# Patient Record
Sex: FEMALE | Race: WHITE | NOT HISPANIC OR LATINO | Employment: UNEMPLOYED | URBAN - METROPOLITAN AREA
[De-identification: names, ages, dates, MRNs, and addresses within clinical notes are randomized per-mention and may not be internally consistent; named-entity substitution may affect disease eponyms.]

---

## 2018-05-07 ENCOUNTER — HOSPITAL ENCOUNTER (EMERGENCY)
Facility: CLINIC | Age: 23
Discharge: HOME | End: 2018-05-07
Attending: PHYSICIAN ASSISTANT | Admitting: PHYSICIAN ASSISTANT
Payer: MEDICARE

## 2018-05-07 VITALS
SYSTOLIC BLOOD PRESSURE: 150 MMHG | WEIGHT: 205 LBS | TEMPERATURE: 97 F | HEART RATE: 93 BPM | OXYGEN SATURATION: 100 % | RESPIRATION RATE: 16 BRPM | DIASTOLIC BLOOD PRESSURE: 100 MMHG

## 2018-05-07 DIAGNOSIS — N30.00 ACUTE CYSTITIS WITHOUT HEMATURIA: ICD-10-CM

## 2018-05-07 LAB
ALBUMIN UR-MCNC: 30 MG/DL
APPEARANCE UR: ABNORMAL
BACTERIA #/AREA URNS HPF: ABNORMAL /HPF
BILIRUB UR QL STRIP: NEGATIVE
COLOR UR AUTO: YELLOW
GLUCOSE UR STRIP-MCNC: NEGATIVE MG/DL
HCG UR QL: NEGATIVE
HGB UR QL STRIP: ABNORMAL
KETONES UR STRIP-MCNC: NEGATIVE MG/DL
LEUKOCYTE ESTERASE UR QL STRIP: ABNORMAL
MUCOUS THREADS #/AREA URNS LPF: PRESENT /LPF
NITRATE UR QL: NEGATIVE
PH UR STRIP: 5 PH (ref 5–7)
RBC #/AREA URNS AUTO: 12 /HPF (ref 0–2)
SOURCE: ABNORMAL
SP GR UR STRIP: 1.03 (ref 1–1.03)
SQUAMOUS #/AREA URNS AUTO: 3 /HPF (ref 0–1)
UROBILINOGEN UR STRIP-MCNC: 0 MG/DL (ref 0–2)
WBC #/AREA URNS AUTO: 77 /HPF (ref 0–5)

## 2018-05-07 PROCEDURE — 87086 URINE CULTURE/COLONY COUNT: CPT | Performed by: PHYSICIAN ASSISTANT

## 2018-05-07 PROCEDURE — 87186 SC STD MICRODIL/AGAR DIL: CPT | Performed by: PHYSICIAN ASSISTANT

## 2018-05-07 PROCEDURE — 81025 URINE PREGNANCY TEST: CPT | Performed by: PHYSICIAN ASSISTANT

## 2018-05-07 PROCEDURE — 87088 URINE BACTERIA CULTURE: CPT | Performed by: PHYSICIAN ASSISTANT

## 2018-05-07 PROCEDURE — 81001 URINALYSIS AUTO W/SCOPE: CPT | Performed by: PHYSICIAN ASSISTANT

## 2018-05-07 PROCEDURE — 99213 OFFICE O/P EST LOW 20 MIN: CPT | Performed by: PHYSICIAN ASSISTANT

## 2018-05-07 PROCEDURE — G0463 HOSPITAL OUTPT CLINIC VISIT: HCPCS

## 2018-05-07 RX ORDER — NITROFURANTOIN 25; 75 MG/1; MG/1
100 CAPSULE ORAL 2 TIMES DAILY
Qty: 14 CAPSULE | Refills: 0 | Status: SHIPPED | OUTPATIENT
Start: 2018-05-07 | End: 2018-05-14

## 2018-05-07 ASSESSMENT — ENCOUNTER SYMPTOMS: DYSURIA: 1

## 2018-05-07 NOTE — ED AVS SNAPSHOT
[facility] Emergency Department    [address] [facility] [address]    Phone:  [phone]    Fax:  [phone]                                       Ana Murphy   MRN: [id number]    Department:  [facility] Emergency Department   Date of Visit:  5/7/2018           After Visit Summary Signature Page     I have received my discharge instructions, and my questions have been answered. I have discussed any challenges I see with this plan with the nurse or doctor.    ..........................................................................................................................................  Patient/Patient Representative Signature      ..........................................................................................................................................  Patient Representative Print Name and Relationship to Patient    ..................................................               ................................................  Date                                            Time    ..........................................................................................................................................  Reviewed by Signature/Title    ...................................................              ..............................................  Date                                                            Time

## 2018-05-07 NOTE — ED PROVIDER NOTES
History     Chief Complaint   Patient presents with     UTI     uti sx started today, no flank pain, no fever     HPI  Ana Murphy is a 22 year old female who  presents today with urinary symptoms. Symptoms of dysuria, urgency and frequency have been going on for 1day(s).  Hematuria no.  sudden onsetand mild.  This patient does have a history of urinary tract infections.   Vaginal symptoms none; patient currently menstruating.      Any history of STDs yes  No symptoms at this time.     Patient denies fevers, abdominal pain, back pain, nausea/vomiting.     Problem list, Medication list, Allergies, and Medical/Social/Surgical histories reviewed in [facility] and updated as appropriate.    Problem List:    There are no active problems to display for this patient.       Past Medical History:    No past medical history on file.    Past Surgical History:    No past surgical history on file.    Family History:    No family history on file.    Social History:  Marital Status:  Single [1]  Social History   Substance Use Topics     Smoking status: Not on file     Smokeless tobacco: Not on file     Alcohol use Not on file        Medications:      nitroFURantoin, macrocrystal-monohydrate, (MACROBID) 100 MG capsule         Review of Systems   Genitourinary: Positive for dysuria and urgency.   All other systems reviewed and are negative.      Physical Exam   BP: (!) 150/100  Pulse: 93  Temp: 97  F (36.1  C)  Resp: 16  Weight: 93 kg (205 lb)  SpO2: 100 %      Physical Exam     BP (!) 150/100  Pulse 93  Temp 97  F (36.1  C) (Temporal)  Resp 16  Wt 93 kg (205 lb)  SpO2 100%    GENERAL APPEARANCE: healthy, alert and no distress  RESP: lungs clear to auscultation - no rales, rhonchi or wheezes  CV: regular rates and rhythm, normal S1 S2, no murmur noted  ABDOMEN:  soft, nontender, no HSM or masses and bowel sounds normal  BACK: No CVA tenderness  SKIN: no suspicious lesions or rashes    No results found for this or any  previous visit (from the past 24 hour(s)).        ED Course     ED Course     Procedures              Critical Care time:  none               Results for orders placed or performed during the hospital encounter of 05/07/18 (from the past 24 hour(s))   HCG qualitative urine   Result Value Ref Range    HCG Qual Urine Negative NEG^Negative   UA with Microscopic   Result Value Ref Range    Color Urine Yellow     Appearance Urine Slightly Cloudy     Glucose Urine Negative NEG^Negative mg/dL    Bilirubin Urine Negative NEG^Negative    Ketones Urine Negative NEG^Negative mg/dL    Specific Gravity Urine 1.027 1.003 - 1.035    Blood Urine Small (A) NEG^Negative    pH Urine 5.0 5.0 - 7.0 pH    Protein Albumin Urine 30 (A) NEG^Negative mg/dL    Urobilinogen mg/dL 0.0 0.0 - 2.0 mg/dL    Nitrite Urine Negative NEG^Negative    Leukocyte Esterase Urine Moderate (A) NEG^Negative    Source Midstream Urine     WBC Urine 77 (H) 0 - 5 /HPF    RBC Urine 12 (H) 0 - 2 /HPF    Bacteria Urine Few (A) NEG^Negative /HPF    Squamous Epithelial /HPF Urine 3 (H) 0 - 1 /HPF    Mucous Urine Present (A) NEG^Negative /LPF       Medications - No data to display    Assessments & Plan (with Medical Decision Making)     I have reviewed the nursing notes.    I have reviewed the findings, diagnosis, plan and need for follow up with the patient.       New Prescriptions    NITROFURANTOIN, MACROCRYSTAL-MONOHYDRATE, (MACROBID) 100 MG CAPSULE    Take 1 capsule (100 mg) by mouth 2 times daily for 7 days       Final diagnoses:   Acute cystitis without hematuria       5/7/2018   [facility] EMERGENCY DEPARTMENT     Denise Del Toro PA-C  05/07/18 6210

## 2018-05-07 NOTE — DISCHARGE INSTRUCTIONS
Use Medication as directed    Patient advised to call for any lab results (if obtained during visit) within 2-3 days.   Drink plenty of fluids.     Prevention and treatment of UTI's discussed.  Signs and symptoms of pyelonephritis mentioned.    Patient to return to clinic sooner if not improving as expected.   Go to ER if any worsening symptoms or signs/symptoms of phylonephritis occur.

## 2018-05-07 NOTE — ED AVS SNAPSHOT
[facility] Emergency Department    [address] [facility] [facility] [address]    Phone:  [phone]    Fax:  [phone]                                       Ana Murphy   MRN: [id number]    Department:  [facility] Emergency Department   Date of Visit:  5/7/2018           Patient Information     Date Of Birth          1995        Your diagnoses for this visit were:     Acute cystitis without hematuria        You were seen by Denise Del Toro PA-C.      Follow-up Information     Please follow up.    Why:  As needed, If symptoms worsen        Discharge Instructions       Use Medication as directed    Patient advised to call for any lab results (if obtained during visit) within 2-3 days.   Drink plenty of fluids.     Prevention and treatment of UTI's discussed.  Signs and symptoms of pyelonephritis mentioned.    Patient to return to clinic sooner if not improving as expected.   Go to ER if any worsening symptoms or signs/symptoms of phylonephritis occur.           24 Hour Appointment Hotline       To make an appointment at any [facility] clinic, call [phone] ([phone]). If you don't have a family doctor or clinic, we will help you find one. [facility] clinics are conveniently located to serve the needs of you and your family.             Review of your medicines      START taking        Dose / Directions Last dose taken    nitroFURantoin (macrocrystal-monohydrate) 100 MG capsule   Commonly known as:  MACROBID   Dose:  100 mg   Quantity:  14 capsule        Take 1 capsule (100 mg) by mouth 2 times daily for 7 days   Refills:  0                Prescriptions were sent or printed at these locations (1 Prescription)                   [facility] [address] [place] [address] [address]    [address] [facility] [address]    Telephone:  [phone]   Fax:  [phone]   Hours:                  E-Prescribed (1 of 1)         nitroFURantoin, macrocrystal-monohydrate,  (MACROBID) 100 MG capsule                Procedures and tests performed during your visit     HCG qualitative urine    UA with Microscopic      Orders Needing Specimen Collection     None      Pending Results     No orders found from 5/5/2018 to 5/8/2018.            Pending Culture Results     No orders found from 5/5/2018 to 5/8/2018.            Pending Results Instructions     If you had any lab results that were not finalized at the time of your Discharge, you can call the ED Lab Result RN at [phone]. You will be contacted by this team for any positive Lab results or changes in treatment. The nurses are available 7 days a week from 10A to 6:30P.  You can leave a message 24 hours per day and they will return your call.        Test Results From Your Hospital Stay        5/7/2018  6:03 PM      Component Results     Component Value Ref Range & Units Status    HCG Qual Urine Negative NEG^Negative Final    This test is for screening purposes.  Results should be interpreted along with   the clinical picture.  Confirmation testing is available if warranted by   ordering EUX722, HCG Quantitative Pregnancy.           5/7/2018  6:23 PM      Component Results     Component Value Ref Range & Units Status    Color Urine Yellow  Final    Appearance Urine Slightly Cloudy  Final    Glucose Urine Negative NEG^Negative mg/dL Final    Bilirubin Urine Negative NEG^Negative Final    Ketones Urine Negative NEG^Negative mg/dL Final    Specific Gravity Urine 1.027 1.003 - 1.035 Final    Blood Urine Small (A) NEG^Negative Final    pH Urine 5.0 5.0 - 7.0 pH Final    Protein Albumin Urine 30 (A) NEG^Negative mg/dL Final    Urobilinogen mg/dL 0.0 0.0 - 2.0 mg/dL Final    Nitrite Urine Negative NEG^Negative Final    Leukocyte Esterase Urine Moderate (A) NEG^Negative Final    Source Midstream Urine  Final    WBC Urine 77 (H) 0 - 5 /HPF Final    RBC Urine 12 (H) 0 - 2 /HPF Final    Bacteria Urine Few (A) NEG^Negative /HPF Final    Squamous  "Epithelial /HPF Urine 3 (H) 0 - 1 /HPF Final    Mucous Urine Present (A) NEG^Negative /LPF Final                Thank you for choosing [facility]       Thank you for choosing [facility] for your care. Our goal is always to provide you with excellent care. Hearing back from our patients is one way we can continue to improve our services. Please take a few minutes to complete the written survey that you may receive in the mail after you visit with us. Thank you!        [employer] Information     [employer] lets you send messages to your doctor, view your test results, renew your prescriptions, schedule appointments and more. To sign up, go to [email or web address] . Click on \"Log in\" on the left side of the screen, which will take you to the Welcome page. Then click on \"Sign up Now\" on the right side of the page.     You will be asked to enter the access code listed below, as well as some personal information. Please follow the directions to create your username and password.     Your access code is: [email or web address]  Expires: 2018  6:36 PM     Your access code will  in 90 days. If you need help or a new code, please call your [facility] clinic or [phone].        Care EveryWhere ID     This is your Care EveryWhere ID. This could be used by other organizations to access your [facility] medical records  [id number]        Equal Access to Services     GENARO SYKES AH: Hadii cyrus Hatch, waaxda luqadaha, qaybta kaaldarrick venegas. So [facility] [phone].    ATENCIÓN: Si habla español, tiene a thorne disposición servicios gratuitos de asistencia lingüística. Ricky al [phone].    We comply with applicable federal civil rights laws and [place] laws. We do not discriminate on the basis of race, color, national origin, age, disability, sex, sexual orientation, or gender identity.            After Visit Summary       This is your record. Keep this with you and show to your " community pharmacist(s) and doctor(s) at your next visit.

## 2018-05-09 LAB
BACTERIA SPEC CULT: ABNORMAL
BACTERIA SPEC CULT: ABNORMAL
Lab: ABNORMAL
SPECIMEN SOURCE: ABNORMAL

## 2018-09-23 ENCOUNTER — HOSPITAL ENCOUNTER (EMERGENCY)
Facility: CLINIC | Age: 23
Discharge: HOME | End: 2018-09-23
Attending: PHYSICIAN ASSISTANT | Admitting: PHYSICIAN ASSISTANT
Payer: MEDICARE

## 2018-09-23 VITALS
OXYGEN SATURATION: 100 % | WEIGHT: 200 LBS | SYSTOLIC BLOOD PRESSURE: 146 MMHG | HEIGHT: 69 IN | DIASTOLIC BLOOD PRESSURE: 90 MMHG | BODY MASS INDEX: 29.62 KG/M2 | TEMPERATURE: 98.8 F | RESPIRATION RATE: 20 BRPM

## 2018-09-23 DIAGNOSIS — H66.92 LEFT ACUTE OTITIS MEDIA: Primary | ICD-10-CM

## 2018-09-23 PROCEDURE — 99214 OFFICE O/P EST MOD 30 MIN: CPT | Mod: Z6 | Performed by: PHYSICIAN ASSISTANT

## 2018-09-23 PROCEDURE — G0463 HOSPITAL OUTPT CLINIC VISIT: HCPCS | Performed by: PHYSICIAN ASSISTANT

## 2018-09-23 RX ORDER — AMOXICILLIN 875 MG
875 TABLET ORAL 2 TIMES DAILY
Qty: 20 TABLET | Refills: 0 | Status: SHIPPED | OUTPATIENT
Start: 2018-09-23 | End: 2018-10-03

## 2018-09-23 ASSESSMENT — ENCOUNTER SYMPTOMS
COUGH: 1
FEVER: 0
CARDIOVASCULAR NEGATIVE: 1
MUSCULOSKELETAL NEGATIVE: 1
RHINORRHEA: 1
CONSTITUTIONAL NEGATIVE: 1

## 2018-09-23 NOTE — ED AVS SNAPSHOT
[facility] Emergency Department    [address] [address]    Phone:  [phone]    Fax:  [phone]                                       Ana Murphy   MRN: [id number]    Department:  [facility] Emergency Department   Date of Visit:  9/23/2018           Patient Information     Date Of Birth          1995        Your diagnoses for this visit were:     Left acute otitis media        You were seen by Liberty Merchant PA-C.      Follow-up Information     Follow up with [facility]. Call in 5 days.    Specialty:  Family Practice    Why:  As needed, For persistent symptoms    Contact information:    [address] [facility] [address] [phone]    Additional information:    The medical center is located at   [address] [place] [address] (between [facility] and   [address] [place] [address], [place] north   of [place]).        Follow up with [facility] Emergency Department.    Specialty:  EMERGENCY MEDICINE    Why:  As needed, If symptoms worsen    Contact information:    [address] [facility] [id number] [phone]    Additional information:    The medical center is located at   [address] [place] [address] (between [facility] and   [address], four miles north   of [place]).        Discharge Instructions         Middle Ear Infection (Adult)  You have an infection of the middle ear, the space behind the eardrum. This is also called acute otitis media (AOM). Sometimes it is caused by the common cold. This is because congestion can block the internal passage (eustachian tube) that drains fluid from the middle ear. When the middle ear fills with fluid, bacteria can grow there and cause an infection. Oral antibiotics are used to treat this illness, not ear drops. Symptoms usually start to improve within 1 to 2 days of treatment.    Home care  The following are general care guidelines:    Finish all of the antibiotic medicine given,  even though you may feel better after the first few days.    You may use over-the-counter medicine, such as acetaminophen or ibuprofen, to control pain and fever, unless something else was prescribed. If you have chronic liver or kidney disease or have ever had a stomach ulcer or gastrointestinal bleeding, talk with your healthcare provider before using these medicines. Do not give aspirin to anyone under 18 years of age who has a fever. It may cause severe illness or death.  Follow-up care  Follow up with your healthcare provider, or as advised, in 2 weeks if all symptoms have not gotten better, or if hearing doesn't go back to normal within 1 month.  When to seek medical advice  Call your healthcare provider right away if any of these occur:    Ear pain gets worse or does not improve after 3 days of treatment    Unusual drowsiness or confusion    Neck pain, stiff neck, or headache    Fluid or blood draining from the ear canal    Fever of 100.4 F (38 C) or as advised     Seizure  Date Last Reviewed: 6/1/2016 2000-2017 [employer]. [address] [place] [address]. All rights reserved. This information is not intended as a substitute for professional medical care. Always follow your healthcare professional's instructions.          24 Hour Appointment Hotline       To make an appointment at any [facility] clinic, call [phone] ([phone]). If you don't have a family doctor or clinic, we will help you find one. [facility] clinics are conveniently located to serve the needs of you and your family.             Review of your medicines      START taking        Dose / Directions Last dose taken    amoxicillin 875 MG tablet   Commonly known as:  AMOXIL   Dose:  875 mg   Quantity:  20 tablet        Take 1 tablet (875 mg) by mouth 2 times daily for 10 days   Refills:  0                Prescriptions were sent or printed at these locations (1 Prescription)                   [facility]  "[place], [place] - [address] [facility]   [address] [place] [address]    Telephone:  [phone]   Fax:  [phone]   Hours:                  E-Prescribed (1 of 1)         amoxicillin (AMOXIL) 875 MG tablet                Orders Needing Specimen Collection     None      Pending Results     No orders found from 2018 to 2018.            Pending Culture Results     No orders found from 2018 to 2018.            Pending Results Instructions     If you had any lab results that were not finalized at the time of your Discharge, you can call the ED Lab Result RN at [phone]. You will be contacted by this team for any positive Lab results or changes in treatment. The nurses are available 7 days a week from 10A to 6:30P.  You can leave a message 24 hours per day and they will return your call.        Test Results From Your Hospital Stay               Thank you for choosing [facility]       Thank you for choosing [facility] for your care. Our goal is always to provide you with excellent care. Hearing back from our patients is one way we can continue to improve our services. Please take a few minutes to complete the written survey that you may receive in the mail after you visit with us. Thank you!        [employer]auf[employer]hart Information     [email or web address] lets you send messages to your doctor, view your test results, renew your prescriptions, schedule appointments and more. To sign up, go to [email or web address] . Click on \"Log in\" on the left side of the screen, which will take you to the Welcome page. Then click on \"Sign up Now\" on the right side of the page.     You will be asked to enter the access code listed below, as well as some personal information. Please follow the directions to create your username and password.     Your access code is: [email or web address]  Expires: 2018  7:51 PM     Your access code will  in 90 days. If you need help or a new code, please call your [facility] clinic or " [phone].        Care EveryWhere ID     This is your Care EveryWhere ID. This could be used by other organizations to access your [facility] medical records  [id number]        Equal Access to Services     GENARO SYKES : Briseida Hatch, wakuldipda maximilian, qaybta kaalmada rissajimderek, darrick starr. So [facility] [phone].    ATENCIÓN: Si habla español, tiene a thorne disposición servicios gratuitos de asistencia lingüística. Llame al [phone].    We comply with applicable federal civil rights laws and [place] laws. We do not discriminate on the basis of race, color, national origin, age, disability, sex, sexual orientation, or gender identity.            After Visit Summary       This is your record. Keep this with you and show to your community pharmacist(s) and doctor(s) at your next visit.

## 2018-09-23 NOTE — ED AVS SNAPSHOT
[facility] Emergency Department    [address] [facility] [address]    Phone:  [phone]    Fax:  [phone]                                       Ana Murphy   MRN: [id number]    Department:  [facility] Emergency Department   Date of Visit:  9/23/2018           After Visit Summary Signature Page     I have received my discharge instructions, and my questions have been answered. I have discussed any challenges I see with this plan with the nurse or doctor.    ..........................................................................................................................................  Patient/Patient Representative Signature      ..........................................................................................................................................  Patient Representative Print Name and Relationship to Patient    ..................................................               ................................................  Date                                   Time    ..........................................................................................................................................  Reviewed by Signature/Title    ...................................................              ..............................................  Date                                               Time          22EPIC Rev 08/18

## 2018-09-24 NOTE — ED TRIAGE NOTES
Patient presents today with ear ache and cold symptoms . Symptoms started this morning. Arrived to urgent care ambulatory .

## 2018-09-24 NOTE — ED PROVIDER NOTES
"  History     Chief Complaint   Patient presents with     Otalgia     L ear stareted this AM/ Taking Tylenol     HPI  Ana Murphy is a 22 year old female who presents with complaints of left ear pain that has been worsening since yesterday.  Patient states she feels as if there is fluid behind her eardrum.  She has had worsening pain since that time.  She has also had associated upper respiratory symptoms including nasal and and rhinorrhea as well as a cough for the past week.  No drainage from the ear.  Denies fevers, chills, or sore throat.      Problem List:    There are no active problems to display for this patient.       Past Medical History:    No past medical history on file.    Past Surgical History:    No past surgical history on file.    Family History:    No family history on file.    Social History:  Marital Status:  Single [1]  Social History   Substance Use Topics     Smoking status: Not on file     Smokeless tobacco: Not on file     Alcohol use Not on file        Medications:      amoxicillin (AMOXIL) 875 MG tablet         Review of Systems   Constitutional: Negative.  Negative for fever.   HENT: Positive for congestion, ear pain (left) and rhinorrhea.    Respiratory: Positive for cough.    Cardiovascular: Negative.    Musculoskeletal: Negative.    Skin: Negative.    All other systems reviewed and are negative.      Physical Exam   BP: 146/90  Heart Rate: 90  Temp: 98.8  F (37.1  C)  Resp: 20  Height: 175.3 cm (5' 9\")  Weight: 90.7 kg (200 lb)  SpO2: 100 %      Physical Exam   Constitutional: She is oriented to person, place, and time. She appears well-developed and well-nourished.  Non-toxic appearance. No distress.   HENT:   Head: Normocephalic and atraumatic.   Right Ear: Tympanic membrane, external ear and ear canal normal.   Left Ear: External ear and ear canal normal. No mastoid tenderness. Tympanic membrane is erythematous and bulging. Tympanic membrane is not perforated. A middle ear " effusion is present.   Nose: Mucosal edema and rhinorrhea present.   Mouth/Throat: Uvula is midline, oropharynx is clear and moist and mucous membranes are normal. No uvula swelling. No oropharyngeal exudate, posterior oropharyngeal edema, posterior oropharyngeal erythema or tonsillar abscesses.   Eyes: Conjunctivae and EOM are normal. Pupils are equal, round, and reactive to light.   Neck: Normal range of motion and full passive range of motion without pain. Neck supple. No rigidity. Normal range of motion present.   Cardiovascular: Normal rate, regular rhythm and normal heart sounds.    Pulmonary/Chest: Effort normal and breath sounds normal. No respiratory distress. She has no wheezes. She has no rales.   Lymphadenopathy:     She has no cervical adenopathy.   Neurological: She is alert and oriented to person, place, and time.   Skin: Skin is warm and dry. No rash noted.       ED Course     ED Course     Procedures    No results found for this or any previous visit (from the past 24 hour(s)).    Medications - No data to display    Assessments & Plan (with Medical Decision Making)     Pt is a 22 year old female who presents with complaints of left ear pain that has been worsening since yesterday.  Patient states she feels as if there is fluid behind her eardrum.  She has had worsening pain since that time.  She has also had associated upper respiratory symptoms including nasal and and rhinorrhea as well as a cough for the past week.  Pt is afebrile on arrival.  Exam as above.  Return precautions were reviewed.  Hand-outs were provided.    Patient was sent with Amoxicillin and was instructed to follow-up with PCP if no improvement in 5-7 days for continued care and management or sooner if new or worsening symptoms.  She is to return to the ED for persistent and/or worsening symptoms.  Patient expressed understanding of the diagnosis and plan and was discharged home in good condition.    I have reviewed the nursing  notes.    I have reviewed the findings, diagnosis, plan and need for follow up with the patient.    Discharge Medication List as of 9/23/2018  7:58 PM      START taking these medications    Details   amoxicillin (AMOXIL) 875 MG tablet Take 1 tablet (875 mg) by mouth 2 times daily for 10 days, Disp-20 tablet, R-0, E-Prescribe             Final diagnoses:   Left acute otitis media       9/23/2018   [facility] EMERGENCY DEPARTMENT      Disclaimer:  This note consists of symbols derived from keyboarding, dictation and/or voice recognition software.  As a result, there may be errors in the script that have gone undetected.  Please consider this when interpreting information found in this chart.     Liberty Merchant PA-C  09/23/18 4797

## 2018-09-24 NOTE — DISCHARGE INSTRUCTIONS
Middle Ear Infection (Adult)  You have an infection of the middle ear, the space behind the eardrum. This is also called acute otitis media (AOM). Sometimes it is caused by the common cold. This is because congestion can block the internal passage (eustachian tube) that drains fluid from the middle ear. When the middle ear fills with fluid, bacteria can grow there and cause an infection. Oral antibiotics are used to treat this illness, not ear drops. Symptoms usually start to improve within 1 to 2 days of treatment.    Home care  The following are general care guidelines:    Finish all of the antibiotic medicine given, even though you may feel better after the first few days.    You may use over-the-counter medicine, such as acetaminophen or ibuprofen, to control pain and fever, unless something else was prescribed. If you have chronic liver or kidney disease or have ever had a stomach ulcer or gastrointestinal bleeding, talk with your healthcare provider before using these medicines. Do not give aspirin to anyone under 18 years of age who has a fever. It may cause severe illness or death.  Follow-up care  Follow up with your healthcare provider, or as advised, in 2 weeks if all symptoms have not gotten better, or if hearing doesn't go back to normal within 1 month.  When to seek medical advice  Call your healthcare provider right away if any of these occur:    Ear pain gets worse or does not improve after 3 days of treatment    Unusual drowsiness or confusion    Neck pain, stiff neck, or headache    Fluid or blood draining from the ear canal    Fever of 100.4 F (38 C) or as advised     Seizure  Date Last Reviewed: 6/1/2016 2000-2017 [employer]. [address] [place] [address], [place] [address]. All rights reserved. This information is not intended as a substitute for professional medical care. Always follow your healthcare professional's instructions.

## 2018-10-27 ENCOUNTER — HOSPITAL ENCOUNTER (EMERGENCY)
Facility: CLINIC | Age: 23
Discharge: HOME | End: 2018-10-27
Attending: EMERGENCY MEDICINE | Admitting: EMERGENCY MEDICINE
Payer: MEDICARE

## 2018-10-27 VITALS
OXYGEN SATURATION: 100 % | TEMPERATURE: 98.1 F | DIASTOLIC BLOOD PRESSURE: 87 MMHG | HEART RATE: 84 BPM | WEIGHT: 200 LBS | HEIGHT: 69 IN | SYSTOLIC BLOOD PRESSURE: 121 MMHG | BODY MASS INDEX: 29.62 KG/M2 | RESPIRATION RATE: 16 BRPM
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
## 2018-10-27 DIAGNOSIS — H18.892 CORNEAL IRRITATION OF LEFT EYE: ICD-10-CM

## 2018-10-27 PROCEDURE — 99281 EMR DPT VST MAYX REQ PHY/QHP: CPT

## 2018-10-27 PROCEDURE — 99283 EMERGENCY DEPT VISIT LOW MDM: CPT | Performed by: EMERGENCY MEDICINE

## 2018-10-27 PROCEDURE — 99283 EMERGENCY DEPT VISIT LOW MDM: CPT | Mod: Z6 | Performed by: EMERGENCY MEDICINE

## 2018-10-27 RX ORDER — DEXTROAMPHETAMINE SACCHARATE, AMPHETAMINE ASPARTATE, DEXTROAMPHETAMINE SULFATE AND AMPHETAMINE SULFATE 7.5; 7.5; 7.5; 7.5 MG/1; MG/1; MG/1; MG/1
30 TABLET ORAL DAILY PRN
COMMUNITY
Start: 2018-09-27 | End: 2020-04-02

## 2018-10-27 RX ORDER — BACLOFEN 10 MG/1
1 TABLET ORAL 2 TIMES DAILY PRN
COMMUNITY
Start: 2018-04-20 | End: 2019-04-19

## 2018-10-27 RX ORDER — BUSPIRONE HYDROCHLORIDE 10 MG/1
10 TABLET ORAL 2 TIMES DAILY
COMMUNITY
Start: 2018-09-20 | End: 2019-04-19

## 2018-10-27 ASSESSMENT — VISUAL ACUITY
OD: 20/25
OS: 20/20
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
## 2018-10-27 NOTE — ED AVS SNAPSHOT
[facility] Emergency Department    [address] [address]    Phone:  [phone]    Fax:  [phone]                                       Ana Murphy   MRN: [id number]    Department:  [facility] Emergency Department   Date of Visit:  10/27/2018           After Visit Summary Signature Page     I have received my discharge instructions, and my questions have been answered. I have discussed any challenges I see with this plan with the nurse or doctor.    ..........................................................................................................................................  Patient/Patient Representative Signature      ..........................................................................................................................................  Patient Representative Print Name and Relationship to Patient    ..................................................               ................................................  Date                                   Time    ..........................................................................................................................................  Reviewed by Signature/Title    ...................................................              ..............................................  Date                                               Time          22EPIC Rev 08/18
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
## 2019-04-19 ENCOUNTER — APPOINTMENT (OUTPATIENT)
Dept: CT IMAGING | Facility: CLINIC | Age: 24
End: 2019-04-19
Attending: EMERGENCY MEDICINE
Payer: MEDICARE

## 2019-04-19 ENCOUNTER — HOSPITAL ENCOUNTER (EMERGENCY)
Facility: CLINIC | Age: 24
Discharge: HOME | End: 2019-04-19
Attending: EMERGENCY MEDICINE | Admitting: EMERGENCY MEDICINE
Payer: MEDICARE

## 2019-04-19 VITALS
DIASTOLIC BLOOD PRESSURE: 81 MMHG | TEMPERATURE: 98 F | HEART RATE: 77 BPM | OXYGEN SATURATION: 98 % | BODY MASS INDEX: 28.88 KG/M2 | HEIGHT: 69 IN | SYSTOLIC BLOOD PRESSURE: 124 MMHG | WEIGHT: 195 LBS

## 2019-04-19 DIAGNOSIS — R10.84 ABDOMINAL PAIN, GENERALIZED: ICD-10-CM

## 2019-04-19 LAB
ALBUMIN SERPL-MCNC: 3.7 G/DL (ref 3.4–5)
ALBUMIN UR-MCNC: NEGATIVE MG/DL
ALP SERPL-CCNC: 70 U/L (ref 40–150)
ALT SERPL W P-5'-P-CCNC: 28 U/L (ref 0–50)
ANION GAP SERPL CALCULATED.3IONS-SCNC: 5 MMOL/L (ref 3–14)
APPEARANCE UR: CLEAR
AST SERPL W P-5'-P-CCNC: 17 U/L (ref 0–45)
BASOPHILS # BLD AUTO: 0 10E9/L (ref 0–0.2)
BASOPHILS NFR BLD AUTO: 0.1 %
BILIRUB SERPL-MCNC: 0.8 MG/DL (ref 0.2–1.3)
BILIRUB UR QL STRIP: NEGATIVE
BUN SERPL-MCNC: 12 MG/DL (ref 7–30)
CALCIUM SERPL-MCNC: 8.5 MG/DL (ref 8.5–10.1)
CHLORIDE SERPL-SCNC: 108 MMOL/L (ref 94–109)
CO2 SERPL-SCNC: 26 MMOL/L (ref 20–32)
COLOR UR AUTO: YELLOW
CREAT SERPL-MCNC: 0.71 MG/DL (ref 0.52–1.04)
DIFFERENTIAL METHOD BLD: ABNORMAL
EOSINOPHIL # BLD AUTO: 0 10E9/L (ref 0–0.7)
EOSINOPHIL NFR BLD AUTO: 0.2 %
ERYTHROCYTE [DISTWIDTH] IN BLOOD BY AUTOMATED COUNT: 12.6 % (ref 10–15)
GFR SERPL CREATININE-BSD FRML MDRD: >90 ML/MIN/{1.73_M2}
GLUCOSE SERPL-MCNC: 93 MG/DL (ref 70–99)
GLUCOSE UR STRIP-MCNC: NEGATIVE MG/DL
HCG UR QL: NEGATIVE
HCT VFR BLD AUTO: 44.1 % (ref 35–47)
HGB BLD-MCNC: 14.6 G/DL (ref 11.7–15.7)
HGB UR QL STRIP: NEGATIVE
IMM GRANULOCYTES # BLD: 0.1 10E9/L (ref 0–0.4)
IMM GRANULOCYTES NFR BLD: 0.5 %
KETONES UR STRIP-MCNC: NEGATIVE MG/DL
LACTATE BLD-SCNC: 1 MMOL/L (ref 0.7–2)
LEUKOCYTE ESTERASE UR QL STRIP: ABNORMAL
LIPASE SERPL-CCNC: 56 U/L (ref 73–393)
LYMPHOCYTES # BLD AUTO: 1.2 10E9/L (ref 0.8–5.3)
LYMPHOCYTES NFR BLD AUTO: 6.9 %
MCH RBC QN AUTO: 30.2 PG (ref 26.5–33)
MCHC RBC AUTO-ENTMCNC: 33.1 G/DL (ref 31.5–36.5)
MCV RBC AUTO: 91 FL (ref 78–100)
MONOCYTES # BLD AUTO: 0.7 10E9/L (ref 0–1.3)
MONOCYTES NFR BLD AUTO: 3.8 %
NEUTROPHILS # BLD AUTO: 14.9 10E9/L (ref 1.6–8.3)
NEUTROPHILS NFR BLD AUTO: 88.5 %
NITRATE UR QL: NEGATIVE
NRBC # BLD AUTO: 0 10*3/UL
NRBC BLD AUTO-RTO: 0 /100
PH UR STRIP: 7 PH (ref 5–7)
PLATELET # BLD AUTO: 242 10E9/L (ref 150–450)
POTASSIUM SERPL-SCNC: 3.8 MMOL/L (ref 3.4–5.3)
PROT SERPL-MCNC: 6.9 G/DL (ref 6.8–8.8)
RBC # BLD AUTO: 4.83 10E12/L (ref 3.8–5.2)
RBC #/AREA URNS AUTO: 1 /HPF (ref 0–2)
SODIUM SERPL-SCNC: 139 MMOL/L (ref 133–144)
SOURCE: ABNORMAL
SP GR UR STRIP: 1.02 (ref 1–1.03)
SQUAMOUS #/AREA URNS AUTO: 4 /HPF (ref 0–1)
UROBILINOGEN UR STRIP-MCNC: 0 MG/DL (ref 0–2)
WBC # BLD AUTO: 16.9 10E9/L (ref 4–11)
WBC #/AREA URNS AUTO: 3 /HPF (ref 0–5)

## 2019-04-19 PROCEDURE — 25000128 H RX IP 250 OP 636: Performed by: EMERGENCY MEDICINE

## 2019-04-19 PROCEDURE — 83690 ASSAY OF LIPASE: CPT | Performed by: EMERGENCY MEDICINE

## 2019-04-19 PROCEDURE — 96375 TX/PRO/DX INJ NEW DRUG ADDON: CPT

## 2019-04-19 PROCEDURE — 99284 EMERGENCY DEPT VISIT MOD MDM: CPT | Mod: 25

## 2019-04-19 PROCEDURE — 25000125 ZZHC RX 250: Performed by: EMERGENCY MEDICINE

## 2019-04-19 PROCEDURE — 80053 COMPREHEN METABOLIC PANEL: CPT | Performed by: EMERGENCY MEDICINE

## 2019-04-19 PROCEDURE — 96374 THER/PROPH/DIAG INJ IV PUSH: CPT

## 2019-04-19 PROCEDURE — 85025 COMPLETE CBC W/AUTO DIFF WBC: CPT | Performed by: EMERGENCY MEDICINE

## 2019-04-19 PROCEDURE — 74177 CT ABD & PELVIS W/CONTRAST: CPT

## 2019-04-19 PROCEDURE — 99284 EMERGENCY DEPT VISIT MOD MDM: CPT | Mod: Z6 | Performed by: EMERGENCY MEDICINE

## 2019-04-19 PROCEDURE — 81025 URINE PREGNANCY TEST: CPT | Performed by: EMERGENCY MEDICINE

## 2019-04-19 PROCEDURE — 81001 URINALYSIS AUTO W/SCOPE: CPT | Performed by: EMERGENCY MEDICINE

## 2019-04-19 PROCEDURE — 25000132 ZZH RX MED GY IP 250 OP 250 PS 637: Mod: GY | Performed by: EMERGENCY MEDICINE

## 2019-04-19 PROCEDURE — 96361 HYDRATE IV INFUSION ADD-ON: CPT

## 2019-04-19 PROCEDURE — 83605 ASSAY OF LACTIC ACID: CPT | Performed by: EMERGENCY MEDICINE

## 2019-04-19 RX ORDER — KETOROLAC TROMETHAMINE 30 MG/ML
30 INJECTION, SOLUTION INTRAMUSCULAR; INTRAVENOUS ONCE
Status: COMPLETED | OUTPATIENT
Start: 2019-04-19 | End: 2019-04-19

## 2019-04-19 RX ORDER — SODIUM CHLORIDE 9 MG/ML
1000 INJECTION, SOLUTION INTRAVENOUS CONTINUOUS
Status: DISCONTINUED | OUTPATIENT
Start: 2019-04-19 | End: 2019-04-19 | Stop reason: HOSPADM

## 2019-04-19 RX ORDER — ONDANSETRON 2 MG/ML
4 INJECTION INTRAMUSCULAR; INTRAVENOUS EVERY 30 MIN PRN
Status: DISCONTINUED | OUTPATIENT
Start: 2019-04-19 | End: 2019-04-19 | Stop reason: HOSPADM

## 2019-04-19 RX ORDER — IOPAMIDOL 755 MG/ML
95 INJECTION, SOLUTION INTRAVASCULAR ONCE
Status: COMPLETED | OUTPATIENT
Start: 2019-04-19 | End: 2019-04-19

## 2019-04-19 RX ORDER — DICYCLOMINE HCL 20 MG
20 TABLET ORAL 2 TIMES DAILY PRN
COMMUNITY
End: 2020-04-02

## 2019-04-19 RX ADMIN — SODIUM CHLORIDE 64 ML: 9 INJECTION, SOLUTION INTRAVENOUS at 18:22

## 2019-04-19 RX ADMIN — SODIUM CHLORIDE 1000 ML: 9 INJECTION, SOLUTION INTRAVENOUS at 17:20

## 2019-04-19 RX ADMIN — KETOROLAC TROMETHAMINE 30 MG: 30 INJECTION, SOLUTION INTRAMUSCULAR at 17:25

## 2019-04-19 RX ADMIN — ONDANSETRON 4 MG: 2 INJECTION INTRAMUSCULAR; INTRAVENOUS at 17:24

## 2019-04-19 RX ADMIN — IOPAMIDOL 95 ML: 755 INJECTION, SOLUTION INTRAVENOUS at 18:22

## 2019-04-19 RX ADMIN — MAGNESIUM HYDROXIDE 30 ML: 400 SUSPENSION ORAL at 19:02

## 2019-04-19 ASSESSMENT — PAIN DESCRIPTION - DESCRIPTORS: DESCRIPTORS: ACHING

## 2019-04-19 ASSESSMENT — MIFFLIN-ST. JEOR: SCORE: 1713.89

## 2019-04-19 NOTE — ED AVS SNAPSHOT
[facility] Emergency Department  [address] [address]  Phone:  [phone]  Fax:  [phone]                                    Ana Murphy   MRN: [id number]    Department:  [facility] Emergency Department   Date of Visit:  4/19/2019           After Visit Summary Signature Page    I have received my discharge instructions, and my questions have been answered. I have discussed any challenges I see with this plan with the nurse or doctor.    ..........................................................................................................................................  Patient/Patient Representative Signature      ..........................................................................................................................................  Patient Representative Print Name and Relationship to Patient    ..................................................               ................................................  Date                                   Time    ..........................................................................................................................................  Reviewed by Signature/Title    ...................................................              ..............................................  Date                                               Time          22EPIC Rev 08/18

## 2019-04-19 NOTE — ED PROVIDER NOTES
"  History     Chief Complaint   Patient presents with     Abdominal Pain     generalized since this AM     HPI  Ana Murphy is a 23 year old female who presents with diffuse severe abdominal pain which began this morning.  Pain has improved somewhat but not completely resolved.  Currently pain is moderate and does not radiate to her chest or back.  No associated diarrhea or dark stools.  Patient that she may be more constipated.  She did take some meds for \"irritable bowel\" and thinks that may have benefited.  She has had no abdominal surgeries.  Denies any personal or family history of biliary disease.  Her father did have colon cancer at 40 which she has not had a colonoscopy yet.  She denies history of acid reflux or ulcer.  She denies any urinary symptoms.  She does have an IUD placed.  She thinks she is due for her period any day.  She has had previous problems with ovarian cysts but states this feels different than that.  Currently denies any vaginal discharge or bleeding.  She has had no abdominal surgeries.  No recent travel.  No antibiotic use.  She did eat dinner last evening without issue.  No exposure to infectious GI illness.  She denies any chest pain, shortness breath or cough.    Allergies:  Allergies   Allergen Reactions     Sulfa Drugs Hives       Problem List:    There are no active problems to display for this patient.       Past Medical History:    No past medical history on file.    Past Surgical History:    No past surgical history on file.    Family History:    No family history on file.    Social History:  Marital Status:  Single [1]  Social History     Tobacco Use     Smoking status: Not on file   Substance Use Topics     Alcohol use: Not on file     Drug use: Not on file        Medications:      amphetamine-dextroamphetamine (ADDERALL) 30 MG per tablet   COPPER PO   dicyclomine (BENTYL) 20 MG tablet         Review of Systems all other systems reviewed and are negative.    Physical " "Exam   BP: 135/90  Heart Rate: 100  Temp: 98  F (36.7  C)  Height: 175.3 cm (5' 9\")  Weight: 88.5 kg (195 lb)  SpO2: 98 %      Physical Exam general alert cooperative female in moderate distress.  HEENT shows no scleral icterus.  Speech is clear and concise.  Oral mucosa is moist.  Neck is supple.  Lungs are clear without adventitious sounds.  No CVA tenderness.  Cardiac auscultation is normal but borderline tachycardic.  Abdomen is obese with active bowel sounds.  On palpation she has tenderness in the midepigastrium left upper quadrant and left lower quadrants.  No guarding or rebound is noted.  No organomegaly or masses.  She has some stretch marks but no skin rashes over the affected area.    ED Course        Procedures               Critical Care time:  none               Results for orders placed or performed during the hospital encounter of 04/19/19 (from the past 24 hour(s))   UA reflex to Microscopic   Result Value Ref Range    Color Urine Yellow     Appearance Urine Clear     Glucose Urine Negative NEG^Negative mg/dL    Bilirubin Urine Negative NEG^Negative    Ketones Urine Negative NEG^Negative mg/dL    Specific Gravity Urine 1.017 1.003 - 1.035    Blood Urine Negative NEG^Negative    pH Urine 7.0 5.0 - 7.0 pH    Protein Albumin Urine Negative NEG^Negative mg/dL    Urobilinogen mg/dL 0.0 0.0 - 2.0 mg/dL    Nitrite Urine Negative NEG^Negative    Leukocyte Esterase Urine Trace (A) NEG^Negative    Source Midstream Urine     RBC Urine 1 0 - 2 /HPF    WBC Urine 3 0 - 5 /HPF    Squamous Epithelial /HPF Urine 4 (H) 0 - 1 /HPF   HCG qualitative urine (UPT)   Result Value Ref Range    HCG Qual Urine Negative NEG^Negative   CBC with platelets differential   Result Value Ref Range    WBC 16.9 (H) 4.0 - 11.0 10e9/L    RBC Count 4.83 3.8 - 5.2 10e12/L    Hemoglobin 14.6 11.7 - 15.7 g/dL    Hematocrit 44.1 35.0 - 47.0 %    MCV 91 78 - 100 fl    MCH 30.2 26.5 - 33.0 pg    MCHC 33.1 31.5 - 36.5 g/dL    RDW 12.6 10.0 - " 15.0 %    Platelet Count 242 150 - 450 10e9/L    Diff Method Automated Method     % Neutrophils 88.5 %    % Lymphocytes 6.9 %    % Monocytes 3.8 %    % Eosinophils 0.2 %    % Basophils 0.1 %    % Immature Granulocytes 0.5 %    Nucleated RBCs 0 0 /100    Absolute Neutrophil 14.9 (H) 1.6 - 8.3 10e9/L    Absolute Lymphocytes 1.2 0.8 - 5.3 10e9/L    Absolute Monocytes 0.7 0.0 - 1.3 10e9/L    Absolute Eosinophils 0.0 0.0 - 0.7 10e9/L    Absolute Basophils 0.0 0.0 - 0.2 10e9/L    Abs Immature Granulocytes 0.1 0 - 0.4 10e9/L    Absolute Nucleated RBC 0.0    Comprehensive metabolic panel   Result Value Ref Range    Sodium 139 133 - 144 mmol/L    Potassium 3.8 3.4 - 5.3 mmol/L    Chloride 108 94 - 109 mmol/L    Carbon Dioxide 26 20 - 32 mmol/L    Anion Gap 5 3 - 14 mmol/L    Glucose 93 70 - 99 mg/dL    Urea Nitrogen 12 7 - 30 mg/dL    Creatinine 0.71 0.52 - 1.04 mg/dL    GFR Estimate >90 >60 mL/min/[1.73_m2]    GFR Estimate If Black >90 >60 mL/min/[1.73_m2]    Calcium 8.5 8.5 - 10.1 mg/dL    Bilirubin Total 0.8 0.2 - 1.3 mg/dL    Albumin 3.7 3.4 - 5.0 g/dL    Protein Total 6.9 6.8 - 8.8 g/dL    Alkaline Phosphatase 70 40 - 150 U/L    ALT 28 0 - 50 U/L    AST 17 0 - 45 U/L   Lipase   Result Value Ref Range    Lipase 56 (L) 73 - 393 U/L   Lactic acid whole blood   Result Value Ref Range    Lactic Acid 1.0 0.7 - 2.0 mmol/L   CT Abdomen Pelvis w Contrast    Narrative    CT ABDOMEN PELVIS WITH CONTRAST  4/19/2019 6:31 PM    HISTORY: Abdominal pain, gastroenteritis or colitis suspected.    TECHNIQUE: Scans obtained from the diaphragm through the pelvis with  IV contrast, 95 mL Isovue-370. Radiation dose for this scan was  reduced using automated exposure  control, adjustment of the mA and/or kV according to patient size, or  iterative reconstruction technique.    COMPARISON: None.    FINDINGS: Visualized portions of the lung bases and mediastinal  contents are unremarkable. There are no aggressive osseous lesions.      There is  mild focal fatty infiltration in the left lobe of the liver  adjacent to the falciform ligament. The liver, gallbladder, pancreas,  spleen, bilateral adrenal glands and bilateral kidneys otherwise  enhance normally. There is mild prominence of the right extrarenal  pelvis which is likely a normal variant. No hydronephrosis,  nephrolithiasis, hydroureter or ureteral calculus is identified.  Urinary bladder is grossly unremarkable.    Intrauterine device is seen in the expected position in the uterus.  The uterus and ovaries are otherwise unremarkable.    No adenopathy or free air is seen in the peritoneal cavity. There is a  small amount of free fluid in the cul-de-sac. Aorta is normal in  appearance.    There is thickening and decreased attenuation of the wall of the cecum  at the appendiceal orifice. The appendix is otherwise normal in  appearance. No adjacent inflammatory change to suggest acute  appendicitis. The colon is otherwise of normal caliber and  demonstrates no other pericolonic inflammatory change to suggest  diverticulitis. Small bowel is of normal caliber. The stomach contains  air, fluid and other ingested material, but is otherwise unremarkable.      Impression    IMPRESSION:  1. Thickening and questionable edema in the cecal wall at the  appendiceal orifice of uncertain etiology. This could represent a very  subtle focal colitis. There are no adjacent inflammatory changes  noted. Appendix is otherwise normal in appearance without evidence for  acute appendicitis.  2. Intrauterine device is seen in expected position.  3. Small amount of free fluid in the pelvis is likely physiologic in  etiology.  4. No other significant abnormalities are identified.       Medications   0.9% sodium chloride BOLUS (1,000 mLs Intravenous New Bag 4/19/19 1720)     Followed by   sodium chloride 0.9% infusion (has no administration in time range)   ondansetron (ZOFRAN) injection 4 mg (4 mg Intravenous Given 4/19/19 1723)  "  magnesium hydroxide (MILK OF MAGNESIA) suspension 30 mL (has no administration in time range)   ketorolac (TORADOL) injection 30 mg (30 mg Intravenous Given 4/19/19 1725)   iopamidol (ISOVUE-370) solution 95 mL (95 mLs Intravenous Given 4/19/19 1822)   Saline Flush (64 mLs Intravenous Given 4/19/19 1822)     Patient had an IV established was given fluid bolus, Toradol, Zofran  Patient did have elevated white count but otherwise blood work was unremarkable.  Improved nausea but persistent pain despite meds.  Abdominal CT with oral water and IV contrast is ordered.  Because results of the patient's CT showing a small focal area of thickening questionable edema of the cecal wall at the appendiceal orifice.  The appendix otherwise looks normal.  No other acute abnormality is noted  Assessments & Plan (with Medical Decision Making)   Ana Murphy is a 23 year old female who presents with diffuse severe abdominal pain which began this morning.  Pain has improved somewhat but not completely resolved.  Currently pain is moderate and does not radiate to her chest or back.  No associated diarrhea or dark stools.  Patient that she may be more constipated.  She did take some meds for \"irritable bowel\" and thinks that may have benefited.  She has had no abdominal surgeries.  Denies any personal or family history of biliary disease.  Her father did have colon cancer at 40 which she has not had a colonoscopy yet.  She denies history of acid reflux or ulcer.  She denies any urinary symptoms.  She does have an IUD placed.  She thinks she is due for her period any day.  She has had previous problems with ovarian cysts but states this feels different than that.  Currently denies any vaginal discharge or bleeding.  She has had no abdominal surgeries.  No recent travel.  No antibiotic use.  She did eat dinner last evening without issue.  No exposure to infectious GI illness.  She denies any chest pain, shortness breath or " cough.  Presentation patient was afebrile and vitally stable.  Not hypoxic.  She had no scleral icterus.  No CVA tenderness.  Abdominal exam revealed midepigastric left upper quadrant and left lower quadrant abdominal pain.  No guarding or rebound.  Blood work was unremarkable except leukocytosis noted above.  Urine was unremarkable.  With persistence of pain despite IV Toradol a CT was obtained and did show a focal area of bowel wall thickening noted above.  The appendix otherwise looks normal.  Patient denies history of Crohn's or ulcerative colitis either personally or in the family.  She is given a handout on abdominal pain of unclear etiology.  If she has persistent symptoms consider colonoscopy.  She is given milk of mag to evacuate stool.  If symptoms persist reasons to return were discussed per  I have reviewed the nursing notes.    I have reviewed the findings, diagnosis, plan and need for follow up with the patient.          Medication List      There are no discharge medications for this visit.         Final diagnoses:   Abdominal pain, generalized       4/19/2019   [facility] EMERGENCY DEPARTMENT     Jay Toro MD  04/19/19 [id number]

## 2019-04-19 NOTE — LETTER
April 19, 2019      To Whom It May Concern:      Ana Murphy was seen in our Emergency Department today, 04/19/19.  I expect her condition to improve over the next 2-3 days.  She may return to work when improved.    Sincerely,        Jay Toro MD

## 2019-04-19 NOTE — ED NOTES
Pt reports abdominal pain all over lower abdominal started this morning.   Pt had bowel movement this morning.   Pt drove self to ER.  Pt reports pain during urination.   Pt pain with IUD and needs to get taken out.  Pt's last menses 1 month ago.

## 2019-07-10 ENCOUNTER — HOSPITAL ENCOUNTER (EMERGENCY)
Facility: CLINIC | Age: 24
Discharge: HOME | End: 2019-07-10
Attending: PHYSICIAN ASSISTANT | Admitting: PHYSICIAN ASSISTANT
Payer: MEDICAID

## 2019-07-10 VITALS
WEIGHT: 200 LBS | RESPIRATION RATE: 14 BRPM | TEMPERATURE: 97.8 F | HEIGHT: 69 IN | OXYGEN SATURATION: 98 % | BODY MASS INDEX: 29.62 KG/M2 | DIASTOLIC BLOOD PRESSURE: 81 MMHG | SYSTOLIC BLOOD PRESSURE: 120 MMHG

## 2019-07-10 DIAGNOSIS — H66.002 ACUTE SUPPURATIVE OTITIS MEDIA OF LEFT EAR WITHOUT SPONTANEOUS RUPTURE OF TYMPANIC MEMBRANE, RECURRENCE NOT SPECIFIED: ICD-10-CM

## 2019-07-10 PROCEDURE — 99214 OFFICE O/P EST MOD 30 MIN: CPT | Mod: Z6 | Performed by: PHYSICIAN ASSISTANT

## 2019-07-10 PROCEDURE — G0463 HOSPITAL OUTPT CLINIC VISIT: HCPCS | Performed by: PHYSICIAN ASSISTANT

## 2019-07-10 RX ORDER — AMOXICILLIN 875 MG
875 TABLET ORAL 2 TIMES DAILY
Qty: 14 TABLET | Refills: 0 | Status: SHIPPED | OUTPATIENT
Start: 2019-07-10 | End: 2019-08-27

## 2019-07-10 ASSESSMENT — MIFFLIN-ST. JEOR: SCORE: 1736.57

## 2019-07-10 NOTE — ED PROVIDER NOTES
"  History     Chief Complaint   Patient presents with     Otalgia     left     HPI  Ana Murphy is a 23 year old female who presents to the urgent care with concern over left ear pain which is been present for the last 24 hours.  Patient additionally complains of decreased hearing, discharge from the ear and states that she has had some associated nasal congestion, cough, possible wheezing and shortness of breath with coughing.  She denies any objective fever, nausea, vomiting, diarrhea or abdominal pain.  She states that she does have history of frequent otitis media infections 1-2 times yearly.  She has attempted to treat with tylenol without significant relief.      Allergies:  Allergies   Allergen Reactions     Sulfa Drugs Hives     Problem List:    There are no active problems to display for this patient.     Past Medical History:    No past medical history on file.    Past Surgical History:    No past surgical history on file.    Family History:    No family history on file.    Social History:  Marital Status:  Single [1]  Social History     Tobacco Use     Smoking status: Not on file   Substance Use Topics     Alcohol use: Not on file     Drug use: Not on file      Medications:      amphetamine-dextroamphetamine (ADDERALL) 30 MG per tablet   COPPER PO   dicyclomine (BENTYL) 20 MG tablet       Review of Systems  CONSTITUTIONAL:NEGATIVE for fever, chills, change in weight  INTEGUMENTARY/SKIN: NEGATIVE for worrisome rashes, moles or lesions  EYES: NEGATIVE for vision changes or irritation  ENT/MOUTH: POSITIVE for sore throat, nasal congestion, left era pain   RESP:POSITIVE for cough, questionable shortness of breath, wheezing   GI: NEGATIVE for abdominal pain, diarrhea, nausea and vomiting  Physical Exam   BP: 120/81  Heart Rate: 70  Temp: 97.8  F (36.6  C)  Resp: 14  Height: 175.3 cm (5' 9\")  Weight: 90.7 kg (200 lb)  SpO2: 98 %  Physical Exam  The right TM is normal: no effusions, no erythema, and " normal landmarks     The right auditory canal is normal and without drainage, edema or erythema  The left TM is erythematous, dull, bony landmarks obscured   The left auditory canal is normal and without drainage, edema or erythema  Oropharynx exam is normal: no lesions, erythema, adenopathy or exudate.  GENERAL: no acute distress  EYES: EOMI,  PERRL, conjunctiva clear  NECK: supple, non-tender to palpation, no adenopathy noted  RESP: lungs clear to auscultation - no rales, rhonchi or wheezes  CV: regular rates and rhythm, normal S1 S2, no murmur noted  SKIN: no suspicious lesions or rashes   ED Course        Procedures        Critical Care time:  none        No results found for this or any previous visit (from the past 24 hour(s)).  Medications - No data to display    Assessments & Plan (with Medical Decision Making)     I have reviewed the nursing notes.    I have reviewed the findings, diagnosis, plan and need for follow up with the patient.          Medication List      Started    amoxicillin 875 MG tablet  Commonly known as:  AMOXIL  875 mg, Oral, 2 TIMES DAILY          Final diagnoses:   Acute suppurative otitis media of left ear without spontaneous rupture of tympanic membrane, recurrence not specified     23-year-old female presents to urgent care with concern over left ear pain for the last 24 hours.  She had stable vital signs upon arrival.  Physical exam findings are classic for left otitis media infection.  Do not suspect otitis externa, mastoiditis, TMJ, eustachian tube dysfunction.  Patient was discharged home stable with prescription for amoxicillin.  Follow-up with primary care provider if no improvement within the next 3 days.  Worrisome reasons to return to the ER/UC sooner discussed.    Disclaimer: This note consists of symbols derived from keyboarding, dictation, and/or voice recognition software. As a result, there may be errors in the script that have gone undetected.  Please consider this  when interpreting information found in the chart.    7/10/2019   [facility] EMERGENCY DEPARTMENT     Jenise Connell PA-C  07/10/19 [id number]

## 2019-07-10 NOTE — ED AVS SNAPSHOT
[facility] Emergency Department  [address] [address]  Phone:  [phone]  Fax:  [phone]                                    Ana Murphy   MRN: [id number]    Department:  [facility] Emergency Department   Date of Visit:  7/10/2019           After Visit Summary Signature Page    I have received my discharge instructions, and my questions have been answered. I have discussed any challenges I see with this plan with the nurse or doctor.    ..........................................................................................................................................  Patient/Patient Representative Signature      ..........................................................................................................................................  Patient Representative Print Name and Relationship to Patient    ..................................................               ................................................  Date                                   Time    ..........................................................................................................................................  Reviewed by Signature/Title    ...................................................              ..............................................  Date                                               Time          22EPIC Rev 08/18

## 2019-08-27 ENCOUNTER — HOSPITAL ENCOUNTER (EMERGENCY)
Facility: CLINIC | Age: 24
Discharge: HOME | End: 2019-08-27
Attending: FAMILY MEDICINE | Admitting: FAMILY MEDICINE
Payer: MEDICAID

## 2019-08-27 VITALS
DIASTOLIC BLOOD PRESSURE: 97 MMHG | RESPIRATION RATE: 14 BRPM | TEMPERATURE: 97.2 F | BODY MASS INDEX: 30.27 KG/M2 | OXYGEN SATURATION: 99 % | SYSTOLIC BLOOD PRESSURE: 144 MMHG | WEIGHT: 205 LBS | HEART RATE: 91 BPM

## 2019-08-27 DIAGNOSIS — N89.8 VAGINAL DISCHARGE: ICD-10-CM

## 2019-08-27 DIAGNOSIS — N30.01 ACUTE CYSTITIS WITH HEMATURIA: ICD-10-CM

## 2019-08-27 LAB
ALBUMIN UR-MCNC: 100 MG/DL
APPEARANCE UR: ABNORMAL
BACTERIA #/AREA URNS HPF: ABNORMAL /HPF
BILIRUB UR QL STRIP: NEGATIVE
COLOR UR AUTO: YELLOW
GLUCOSE UR STRIP-MCNC: NEGATIVE MG/DL
HCG UR QL: NEGATIVE
HGB UR QL STRIP: ABNORMAL
KETONES UR STRIP-MCNC: NEGATIVE MG/DL
LEUKOCYTE ESTERASE UR QL STRIP: ABNORMAL
NITRATE UR QL: NEGATIVE
PH UR STRIP: 5 PH (ref 5–7)
RBC #/AREA URNS AUTO: >182 /HPF (ref 0–2)
SOURCE: ABNORMAL
SP GR UR STRIP: 1.01 (ref 1–1.03)
SPECIMEN SOURCE: NORMAL
SQUAMOUS #/AREA URNS AUTO: 4 /HPF (ref 0–1)
UROBILINOGEN UR STRIP-MCNC: 0 MG/DL (ref 0–2)
WBC #/AREA URNS AUTO: >182 /HPF (ref 0–5)
WBC CLUMPS #/AREA URNS HPF: PRESENT /HPF
WET PREP SPEC: NORMAL

## 2019-08-27 PROCEDURE — 81025 URINE PREGNANCY TEST: CPT | Performed by: FAMILY MEDICINE

## 2019-08-27 PROCEDURE — 25000128 H RX IP 250 OP 636: Performed by: FAMILY MEDICINE

## 2019-08-27 PROCEDURE — 87491 CHLMYD TRACH DNA AMP PROBE: CPT | Performed by: FAMILY MEDICINE

## 2019-08-27 PROCEDURE — 87210 SMEAR WET MOUNT SALINE/INK: CPT | Performed by: FAMILY MEDICINE

## 2019-08-27 PROCEDURE — 99284 EMERGENCY DEPT VISIT MOD MDM: CPT | Mod: 25

## 2019-08-27 PROCEDURE — 25000132 ZZH RX MED GY IP 250 OP 250 PS 637: Performed by: FAMILY MEDICINE

## 2019-08-27 PROCEDURE — 87591 N.GONORRHOEAE DNA AMP PROB: CPT | Performed by: FAMILY MEDICINE

## 2019-08-27 PROCEDURE — 99284 EMERGENCY DEPT VISIT MOD MDM: CPT | Mod: Z6 | Performed by: FAMILY MEDICINE

## 2019-08-27 PROCEDURE — 87088 URINE BACTERIA CULTURE: CPT | Performed by: FAMILY MEDICINE

## 2019-08-27 PROCEDURE — 81001 URINALYSIS AUTO W/SCOPE: CPT | Performed by: FAMILY MEDICINE

## 2019-08-27 PROCEDURE — 96372 THER/PROPH/DIAG INJ SC/IM: CPT

## 2019-08-27 PROCEDURE — 87186 SC STD MICRODIL/AGAR DIL: CPT | Performed by: FAMILY MEDICINE

## 2019-08-27 PROCEDURE — 25000125 ZZHC RX 250: Performed by: FAMILY MEDICINE

## 2019-08-27 PROCEDURE — 87086 URINE CULTURE/COLONY COUNT: CPT | Performed by: FAMILY MEDICINE

## 2019-08-27 RX ORDER — NITROFURANTOIN 25; 75 MG/1; MG/1
100 CAPSULE ORAL 2 TIMES DAILY
Qty: 14 CAPSULE | Refills: 0 | Status: SHIPPED | OUTPATIENT
Start: 2019-08-27 | End: 2020-04-02

## 2019-08-27 RX ORDER — CALCIUM CITRATE/VITAMIN D3 200MG-6.25
1 TABLET ORAL DAILY
COMMUNITY
End: 2020-04-02

## 2019-08-27 RX ORDER — WHEAT DEXTRIN 3 G/3.8 G
POWDER (GRAM) ORAL DAILY
COMMUNITY
End: 2020-04-02

## 2019-08-27 RX ORDER — AZITHROMYCIN 250 MG/1
1000 TABLET, FILM COATED ORAL ONCE
Status: COMPLETED | OUTPATIENT
Start: 2019-08-27 | End: 2019-08-27

## 2019-08-27 RX ADMIN — AZITHROMYCIN 1000 MG: 250 TABLET, FILM COATED ORAL at 11:44

## 2019-08-27 RX ADMIN — LIDOCAINE HYDROCHLORIDE 250 MG: 10 INJECTION, SOLUTION EPIDURAL; INFILTRATION; INTRACAUDAL; PERINEURAL at 11:44

## 2019-08-27 ASSESSMENT — ENCOUNTER SYMPTOMS
FREQUENCY: 1
FEVER: 0
ABDOMINAL DISTENTION: 1
WHEEZING: 0
EYE PAIN: 0
ABDOMINAL PAIN: 1
BLOOD IN STOOL: 0
SORE THROAT: 0
HEMATURIA: 1
BACK PAIN: 1
COUGH: 0
DYSURIA: 1

## 2019-08-27 NOTE — ED AVS SNAPSHOT
[facility] Emergency Department  [address] [address]  Phone:  [phone]  Fax:  [phone]                                    Ana Murphy   MRN: [id number]    Department:  [facility] Emergency Department   Date of Visit:  8/27/2019           After Visit Summary Signature Page    I have received my discharge instructions, and my questions have been answered. I have discussed any challenges I see with this plan with the nurse or doctor.    ..........................................................................................................................................  Patient/Patient Representative Signature      ..........................................................................................................................................  Patient Representative Print Name and Relationship to Patient    ..................................................               ................................................  Date                                   Time    ..........................................................................................................................................  Reviewed by Signature/Title    ...................................................              ..............................................  Date                                               Time          22EPIC Rev 08/18

## 2019-08-27 NOTE — DISCHARGE INSTRUCTIONS
ICD-10-CM    1. Acute cystitis with hematuria N30.01     take macrobid orally twice daily for 7 days. await urine culture.  return for worseningm, fever   2. Vaginal discharge N89.8     await GC/Ch tests. you were treated as if you have these with rocephin and zothromax.

## 2019-08-27 NOTE — ED PROVIDER NOTES
"  History     Chief Complaint   Patient presents with     Hematuria     painful urination since yesterday with blood in urine      Rule out Urinary Tract Infection     Exposure to STD     HPI  Ana Murphy is a 23 year old female with history of anxiety, depression, obesity, polysubstance abuse, hypertension, and sensorineural hearing loss who presents to the ED with hematuria starting today. Patient recalls past chlamydia infection 2 years ago and currently has vaginal discharge with occasional blood or brown color for the past couple of months. Last menstrual cycle was 2 weeks ago, on time and normal. She has a Copper T IUD. She also notes dysuria with burning and stinging and urinating frequently.     Patient notes generalized abdominal pain starting two days ago and and feeling \"bloated\". She notes baseline back pain. Patient denies a fever, diabetes mellitus, ear pain, vision changes, sore throat, coughs, wheezing, chest pain, melena and prior pregnancy. Patient admits to tobacco and heavy alcohol use and denies illicit drug use.     Patient was seen in urgent care a month ago (7/10/2019) for a sinus infection and was prescribed amoxicillin.        Allergies:  Allergies   Allergen Reactions     Sulfa Drugs Hives       Problem List:    There are no active problems to display for this patient.       Past Medical History:    No past medical history on file.    Past Surgical History:    No past surgical history on file.    Family History:    No family history on file.    Social History:  Marital Status:  Single [1]  Social History     Tobacco Use     Smoking status: Not on file   Substance Use Topics     Alcohol use: Not on file     Drug use: Not on file        Medications:      amphetamine-dextroamphetamine (ADDERALL) 30 MG per tablet   COPPER PO   dicyclomine (BENTYL) 20 MG tablet         Review of Systems   Constitutional: Negative for fever.   HENT: Negative for sore throat.    Eyes: Negative for pain. "   Respiratory: Negative for cough and wheezing.    Cardiovascular: Negative for chest pain.   Gastrointestinal: Positive for abdominal distention and abdominal pain. Negative for blood in stool.   Genitourinary: Positive for dysuria, frequency, hematuria and vaginal discharge.   Musculoskeletal: Positive for back pain.       Physical Exam   BP: (!) 144/97  Pulse: 91  Temp: 97.2  F (36.2  C)  Resp: 18  Weight: 93 kg (205 lb)  SpO2: 100 %      Physical Exam   Cardiovascular: Normal rate and regular rhythm.   No murmur heard.  Pulmonary/Chest: Effort normal and breath sounds normal.   Abdominal: Bowel sounds are absent. There is generalized tenderness and tenderness in the periumbilical area. There is no guarding.   Genitourinary: Cervix exhibits no motion tenderness. Right adnexum displays no mass. Left adnexum displays no mass. No tenderness or bleeding in the vagina. No foreign body in the vagina. Vaginal discharge found.   Skin:            ED Course        Procedures               Critical Care time:  none               Results for orders placed or performed during the hospital encounter of 08/27/19 (from the past 24 hour(s))   UA with Microscopic   Result Value Ref Range    Color Urine Yellow     Appearance Urine Cloudy     Glucose Urine Negative NEG^Negative mg/dL    Bilirubin Urine Negative NEG^Negative    Ketones Urine Negative NEG^Negative mg/dL    Specific Gravity Urine 1.015 1.003 - 1.035    Blood Urine Large (A) NEG^Negative    pH Urine 5.0 5.0 - 7.0 pH    Protein Albumin Urine 100 (A) NEG^Negative mg/dL    Urobilinogen mg/dL 0.0 0.0 - 2.0 mg/dL    Nitrite Urine Negative NEG^Negative    Leukocyte Esterase Urine Large (A) NEG^Negative    Source Midstream Urine     WBC Urine >182 (H) 0 - 5 /HPF    RBC Urine >182 (H) 0 - 2 /HPF    WBC Clumps Present (A) NEG^Negative /HPF    Bacteria Urine Few (A) NEG^Negative /HPF    Squamous Epithelial /HPF Urine 4 (H) 0 - 1 /HPF   HCG qualitative urine   Result Value Ref  Range    HCG Qual Urine Negative NEG^Negative   Urine Culture Aerobic Bacterial   Result Value Ref Range    Specimen Description Midstream Urine     Special Requests Specimen received in preservative     Culture Micro PENDING    Wet prep   Result Value Ref Range    Specimen Description Cervix     Wet Prep No Trichomonas seen     Wet Prep No yeast seen     Wet Prep No clue cells seen     Wet Prep Moderate  WBC'S seen          Medications   azithromycin (ZITHROMAX) tablet 1,000 mg (1,000 mg Oral Given 8/27/19 1144)   cefTRIAXone (ROCEPHIN) 250 mg in lidocaine (PF) (XYLOCAINE) 1 % injection (250 mg Intramuscular Given 8/27/19 1144)        10:31AM Patient assessed.   Assessments & Plan (with Medical Decision Making)     MDM: Ana Murphy is a 23 year old female who presented with urinary tract symptoms with hematuria dysuria urgency but also concerns for possible increased vaginal discharge over the last month and prior chlamydia.  Her abdominal exam was benign although mildly tender no rebound or guarding, afebrile.  No significant flank pain.  Pelvic exam performed and had normal discharge but we discussed empirically treating for gonorrhea chlamydia while reading results of this wet prep was normal.  Urinalysis is clearly consistent with UTI and she is treated with Macrobid to prevent any interactions with the Zithromax and was given.  Should cover most UTIs.  No signs of upper tract infection.  Await urine culture results.  Precautions given for return.    I have reviewed the nursing notes.    I have reviewed the findings, diagnosis, plan and need for follow up with the patient.       New Prescriptions    No medications on file       Final diagnoses:   Acute cystitis with hematuria - take macrobid orally twice daily for 7 days. await urine culture.  return for worseningm, fever   Vaginal discharge - await GC/Ch tests. you were treated as if you have these with rocephin and zothromax.     This document serves  as a record of the services and decisions personally performed and made by Alexander Oakley MD. It was created on HIS/HER behalf by   Parag Shields, a trained medical scribe. The creation of this document is based the provider's statements to the medical scribe.  Parag Shields 10:23 AM 8/27/2019    Provider:   The information in this document, created by the medical scribe for me, accurately reflects the services I personally performed and the decisions made by me. I have reviewed and approved this document for accuracy prior to leaving the patient care area.  Alexander Oakley MD 10:23 AM 8/27/2019 8/27/2019   [facility] EMERGENCY DEPARTMENT     Alexander Oakley MD  08/27/19 1950

## 2019-08-28 LAB
C TRACH DNA SPEC QL NAA+PROBE: NEGATIVE
N GONORRHOEA DNA SPEC QL NAA+PROBE: NEGATIVE
SPECIMEN SOURCE: NORMAL
SPECIMEN SOURCE: NORMAL

## 2019-08-29 LAB
BACTERIA SPEC CULT: ABNORMAL
BACTERIA SPEC CULT: ABNORMAL
Lab: ABNORMAL
SPECIMEN SOURCE: ABNORMAL

## 2019-08-29 NOTE — RESULT ENCOUNTER NOTE
Final Urine Culture Report on 8/29/19  Emergency Dept/Urgent Care discharge antibiotic prescribed: Nitrofurantoin Macrocrystal-Monohydrate (Macrobid) 100 mg PO capsule, 1 capsule (100 mg) by mouth 2 times daily for 7 days.  #1. Bacteria, >100,000 colonies/mL Escherichia coli, is SUSCEPTIBLE to Antibiotic.    As per [place] ED Lab Result protocol, no change in antibiotic therapy.

## 2019-08-30 ENCOUNTER — TELEPHONE (OUTPATIENT)
Dept: EMERGENCY MEDICINE | Facility: CLINIC | Age: 24
End: 2019-08-30

## 2019-08-30 NOTE — TELEPHONE ENCOUNTER
"[facility] Emergency Department Lab result notification     Patient/parent Name  Ana    Reason for call  Patient requesting lab result.  Advised of all resulted labs.     Lab Result  Final Urine Culture Report on 8/29/19  Emergency Dept/Urgent Care discharge antibiotic prescribed: Nitrofurantoin Macrocrystal-Monohydrate (Macrobid) 100 mg PO capsule, 1 capsule (100 mg) by mouth 2 times daily for 7 days.  #1. Bacteria, >100,000 colonies/mL Escherichia coli, is SUSCEPTIBLE to Antibiotic.    As per [place] ED Lab Result protocol, no change in antibiotic therapy.  Current symptoms  \"I'm definitely better\".      Recommendations/Instructions  To continue with plan of care.    Contact your PCP clinic or return to the Emergency department if your:    Symptoms return.    Symptoms do not resolve after completing antibiotic.    Symptoms worsen or other concerning symptom's.    PCP follow-up Questions asked: YES       Joan Melton RN    [facility]   Lung Nodule and ED Lab Results F/U RN  Epic pool (ED late result f/u RN) : P [id number]   # [phone]    Copy of Lab result   Order   Urine Culture Aerobic Bacterial [[id number]] (Order [id number])   Order Requisition     Urine Culture Aerobic Bacterial (Order #[id number]) on 8/27/19   Exam Information     Exam Date Exam Time Accession # Results    8/27/19 10:02 AM     Component Results     Specimen Information: Midstream Urine        Component Collected Lab   Specimen Description 08/27/2019 10:02    Midstream Urine    Special Requests 08/27/2019 10:02    Specimen received in preservative    Culture Micro Abnormal  08/27/2019 10:02    >100,000 colonies/mL   Escherichia coli    Culture Micro 08/27/2019 10:02    <10,000 colonies/mL   urogenital angie    Susceptibility     Escherichia coli     Antibiotic Interpretation Sensitivity Method Status   AMPICILLIN Sensitive <=2 ug/mL PAUL Final   AMPICILLIN/SULBACTAM Sensitive " <=2 ug/mL PAUL Final   CEFAZOLIN Sensitive <=4 ug/mL PAUL Final    Cefazolin PAUL breakpoints are for the treatment of uncomplicated urinary tract   infections.  For the treatment of systemic infections, please contact the   laboratory for additional testing.   CEFEPIME Sensitive <=1 ug/mL PAUL Final   CEFOXITIN Sensitive <=4 ug/mL PAUL Final   CEFTAZIDIME Sensitive <=1 ug/mL PAUL Final   CEFTRIAXONE Sensitive <=1 ug/mL PAUL Final   CIPROFLOXACIN Resistant >=4 ug/mL PAUL Final   GENTAMICIN Sensitive <=1 ug/mL PAUL Final   LEVOFLOXACIN Resistant >=8 ug/mL PAUL Final   NITROFURANTOIN Sensitive <=16 ug/mL PAUL Final   Piperacillin/Tazo Sensitive <=4 ug/mL PAUL Final   TOBRAMYCIN Sensitive <=1 ug/mL PAUL Final   Trimethoprim/Sulfa Sensitive <=1/19 ug/mL PAUL Final

## 2020-01-18 ENCOUNTER — HOSPITAL ENCOUNTER (EMERGENCY)
Facility: CLINIC | Age: 25
Discharge: HOME | End: 2020-01-19
Attending: EMERGENCY MEDICINE | Admitting: EMERGENCY MEDICINE
Payer: COMMERCIAL

## 2020-01-18 DIAGNOSIS — R11.2 NON-INTRACTABLE VOMITING WITH NAUSEA, UNSPECIFIED VOMITING TYPE: ICD-10-CM

## 2020-01-18 PROCEDURE — 99284 EMERGENCY DEPT VISIT MOD MDM: CPT | Mod: Z6 | Performed by: EMERGENCY MEDICINE

## 2020-01-18 PROCEDURE — 99284 EMERGENCY DEPT VISIT MOD MDM: CPT | Mod: 25

## 2020-01-18 PROCEDURE — 25000128 H RX IP 250 OP 636: Performed by: EMERGENCY MEDICINE

## 2020-01-18 RX ORDER — SODIUM CHLORIDE, SODIUM LACTATE, POTASSIUM CHLORIDE, CALCIUM CHLORIDE 600; 310; 30; 20 MG/100ML; MG/100ML; MG/100ML; MG/100ML
1000 INJECTION, SOLUTION INTRAVENOUS CONTINUOUS
Status: DISCONTINUED | OUTPATIENT
Start: 2020-01-19 | End: 2020-01-19 | Stop reason: HOSPADM

## 2020-01-18 RX ORDER — ONDANSETRON 2 MG/ML
4 INJECTION INTRAMUSCULAR; INTRAVENOUS ONCE
Status: COMPLETED | OUTPATIENT
Start: 2020-01-18 | End: 2020-01-19

## 2020-01-18 RX ORDER — ONDANSETRON 4 MG/1
4 TABLET, ORALLY DISINTEGRATING ORAL ONCE
Status: COMPLETED | OUTPATIENT
Start: 2020-01-18 | End: 2020-01-18

## 2020-01-18 RX ADMIN — ONDANSETRON 4 MG: 4 TABLET, ORALLY DISINTEGRATING ORAL at 23:40

## 2020-01-18 ASSESSMENT — MIFFLIN-ST. JEOR: SCORE: 1731.57

## 2020-01-18 NOTE — ED AVS SNAPSHOT
[facility] Emergency Department  [address] [address]  Phone:  [phone]  Fax:  [phone]                                    Ana Murphy   MRN: [id number]    Department:  [facility] Emergency Department   Date of Visit:  1/18/2020           After Visit Summary Signature Page    I have received my discharge instructions, and my questions have been answered. I have discussed any challenges I see with this plan with the nurse or doctor.    ..........................................................................................................................................  Patient/Patient Representative Signature      ..........................................................................................................................................  Patient Representative Print Name and Relationship to Patient    ..................................................               ................................................  Date                                   Time    ..........................................................................................................................................  Reviewed by Signature/Title    ...................................................              ..............................................  Date                                               Time          22EPIC Rev 08/18

## 2020-01-19 VITALS
BODY MASS INDEX: 29.62 KG/M2 | WEIGHT: 200 LBS | RESPIRATION RATE: 16 BRPM | TEMPERATURE: 98.7 F | DIASTOLIC BLOOD PRESSURE: 76 MMHG | SYSTOLIC BLOOD PRESSURE: 134 MMHG | OXYGEN SATURATION: 99 % | HEART RATE: 94 BPM | HEIGHT: 69 IN

## 2020-01-19 LAB
ALBUMIN SERPL-MCNC: 4.8 G/DL (ref 3.4–5)
ALBUMIN UR-MCNC: 30 MG/DL
ALP SERPL-CCNC: 65 U/L (ref 40–150)
ALT SERPL W P-5'-P-CCNC: 47 U/L (ref 0–50)
ANION GAP SERPL CALCULATED.3IONS-SCNC: 18 MMOL/L (ref 3–14)
APPEARANCE UR: ABNORMAL
AST SERPL W P-5'-P-CCNC: 37 U/L (ref 0–45)
BASOPHILS # BLD AUTO: 0 10E9/L (ref 0–0.2)
BASOPHILS NFR BLD AUTO: 0.1 %
BILIRUB SERPL-MCNC: 0.6 MG/DL (ref 0.2–1.3)
BILIRUB UR QL STRIP: NEGATIVE
BUN SERPL-MCNC: 15 MG/DL (ref 7–30)
CALCIUM SERPL-MCNC: 9.9 MG/DL (ref 8.5–10.1)
CHLORIDE SERPL-SCNC: 102 MMOL/L (ref 94–109)
CO2 SERPL-SCNC: 17 MMOL/L (ref 20–32)
COLOR UR AUTO: YELLOW
CREAT SERPL-MCNC: 0.72 MG/DL (ref 0.52–1.04)
DIFFERENTIAL METHOD BLD: ABNORMAL
EOSINOPHIL # BLD AUTO: 0 10E9/L (ref 0–0.7)
EOSINOPHIL NFR BLD AUTO: 0 %
ERYTHROCYTE [DISTWIDTH] IN BLOOD BY AUTOMATED COUNT: 12.2 % (ref 10–15)
GFR SERPL CREATININE-BSD FRML MDRD: >90 ML/MIN/{1.73_M2}
GLUCOSE SERPL-MCNC: 68 MG/DL (ref 70–99)
GLUCOSE UR STRIP-MCNC: NEGATIVE MG/DL
HCG UR QL: NEGATIVE
HCT VFR BLD AUTO: 45.2 % (ref 35–47)
HGB BLD-MCNC: 15 G/DL (ref 11.7–15.7)
HGB UR QL STRIP: NEGATIVE
IMM GRANULOCYTES # BLD: 0.1 10E9/L (ref 0–0.4)
IMM GRANULOCYTES NFR BLD: 0.3 %
KETONES UR STRIP-MCNC: 80 MG/DL
LEUKOCYTE ESTERASE UR QL STRIP: ABNORMAL
LYMPHOCYTES # BLD AUTO: 1.2 10E9/L (ref 0.8–5.3)
LYMPHOCYTES NFR BLD AUTO: 8.3 %
MCH RBC QN AUTO: 30.5 PG (ref 26.5–33)
MCHC RBC AUTO-ENTMCNC: 33.2 G/DL (ref 31.5–36.5)
MCV RBC AUTO: 92 FL (ref 78–100)
MONOCYTES # BLD AUTO: 0.6 10E9/L (ref 0–1.3)
MONOCYTES NFR BLD AUTO: 4.1 %
MUCOUS THREADS #/AREA URNS LPF: PRESENT /LPF
NEUTROPHILS # BLD AUTO: 12.7 10E9/L (ref 1.6–8.3)
NEUTROPHILS NFR BLD AUTO: 87.2 %
NITRATE UR QL: NEGATIVE
NRBC # BLD AUTO: 0 10*3/UL
NRBC BLD AUTO-RTO: 0 /100
PH UR STRIP: 5 PH (ref 5–7)
PLATELET # BLD AUTO: 370 10E9/L (ref 150–450)
POTASSIUM SERPL-SCNC: 4.8 MMOL/L (ref 3.4–5.3)
PROT SERPL-MCNC: 8.7 G/DL (ref 6.8–8.8)
RBC # BLD AUTO: 4.92 10E12/L (ref 3.8–5.2)
RBC #/AREA URNS AUTO: 2 /HPF (ref 0–2)
SODIUM SERPL-SCNC: 137 MMOL/L (ref 133–144)
SOURCE: ABNORMAL
SP GR UR STRIP: 1.02 (ref 1–1.03)
SQUAMOUS #/AREA URNS AUTO: 6 /HPF (ref 0–1)
UROBILINOGEN UR STRIP-MCNC: 0 MG/DL (ref 0–2)
WBC # BLD AUTO: 14.6 10E9/L (ref 4–11)
WBC #/AREA URNS AUTO: 2 /HPF (ref 0–5)

## 2020-01-19 PROCEDURE — 81001 URINALYSIS AUTO W/SCOPE: CPT | Performed by: EMERGENCY MEDICINE

## 2020-01-19 PROCEDURE — 96372 THER/PROPH/DIAG INJ SC/IM: CPT

## 2020-01-19 PROCEDURE — 25800030 ZZH RX IP 258 OP 636: Performed by: EMERGENCY MEDICINE

## 2020-01-19 PROCEDURE — 81025 URINE PREGNANCY TEST: CPT | Performed by: EMERGENCY MEDICINE

## 2020-01-19 PROCEDURE — 96374 THER/PROPH/DIAG INJ IV PUSH: CPT

## 2020-01-19 PROCEDURE — 96361 HYDRATE IV INFUSION ADD-ON: CPT

## 2020-01-19 PROCEDURE — 25000132 ZZH RX MED GY IP 250 OP 250 PS 637: Mod: GY | Performed by: EMERGENCY MEDICINE

## 2020-01-19 PROCEDURE — 25000128 H RX IP 250 OP 636: Performed by: EMERGENCY MEDICINE

## 2020-01-19 PROCEDURE — 25000128 H RX IP 250 OP 636

## 2020-01-19 PROCEDURE — 85025 COMPLETE CBC W/AUTO DIFF WBC: CPT | Performed by: EMERGENCY MEDICINE

## 2020-01-19 PROCEDURE — 80053 COMPREHEN METABOLIC PANEL: CPT | Performed by: EMERGENCY MEDICINE

## 2020-01-19 RX ORDER — ONDANSETRON 4 MG/1
4 TABLET, ORALLY DISINTEGRATING ORAL EVERY 8 HOURS PRN
Qty: 10 TABLET | Refills: 0 | Status: SHIPPED | OUTPATIENT
Start: 2020-01-19 | End: 2020-04-02

## 2020-01-19 RX ORDER — ONDANSETRON 2 MG/ML
4 INJECTION INTRAMUSCULAR; INTRAVENOUS ONCE
Status: DISCONTINUED | OUTPATIENT
Start: 2020-01-19 | End: 2020-01-19 | Stop reason: HOSPADM

## 2020-01-19 RX ORDER — ONDANSETRON 2 MG/ML
INJECTION INTRAMUSCULAR; INTRAVENOUS
Status: COMPLETED
Start: 2020-01-19 | End: 2020-01-19

## 2020-01-19 RX ADMIN — SODIUM CHLORIDE, POTASSIUM CHLORIDE, SODIUM LACTATE AND CALCIUM CHLORIDE 1000 ML: 600; 310; 30; 20 INJECTION, SOLUTION INTRAVENOUS at 01:27

## 2020-01-19 RX ADMIN — ONDANSETRON 4 MG: 2 INJECTION INTRAMUSCULAR; INTRAVENOUS at 03:05

## 2020-01-19 RX ADMIN — IBUPROFEN 600 MG: 200 TABLET, FILM COATED ORAL at 01:32

## 2020-01-19 RX ADMIN — ONDANSETRON 4 MG: 2 INJECTION INTRAMUSCULAR; INTRAVENOUS at 00:45

## 2020-01-19 RX ADMIN — SODIUM CHLORIDE, POTASSIUM CHLORIDE, SODIUM LACTATE AND CALCIUM CHLORIDE 1000 ML: 600; 310; 30; 20 INJECTION, SOLUTION INTRAVENOUS at 00:44

## 2020-01-19 ASSESSMENT — ENCOUNTER SYMPTOMS
NECK PAIN: 0
DIAPHORESIS: 0
WEAKNESS: 0
DYSURIA: 0
FATIGUE: 0
ABDOMINAL PAIN: 0
HEADACHES: 0
NECK STIFFNESS: 0
VOMITING: 1
COUGH: 0
NAUSEA: 1
SHORTNESS OF BREATH: 0
CHILLS: 0
CHEST TIGHTNESS: 0
DIARRHEA: 0
LIGHT-HEADEDNESS: 0
FEVER: 0

## 2020-01-19 NOTE — ED PROVIDER NOTES
History     Chief Complaint   Patient presents with     Vomiting     vomiting started this morning, no abd pain, pt admits to heavy ETOH last night     HPI  Ana Murphy is a 24 year old female with a history of obesity presenting for evaluation of nausea and vomiting through the day today.  Patient reports she recently has been trying the keto diet and is often nauseated.  Last night she went out drinking and had multiple hard alcoholic drinks.  This morning she woke up and has been severely nauseated and vomiting ever since.  Denies significant abdominal pain.  Denies diarrhea.  Denies fever or chills.  Reports vomiting and dry heaving every roughly 15 minutes throughout the day.  Unable to keep any food or fluid down.  Notes significant decreased urine output.  Feeling very thirsty but unable to put anything down without starting to vomit again.  No known bad food exposure.  No known sick contacts.  LMP about 1 month ago.  Was normal.  Denies any vaginal bleeding or discharge currently.    Allergies:  Allergies   Allergen Reactions     Sulfa Drugs Hives       Problem List:    There are no active problems to display for this patient.       Past Medical History:    No past medical history on file.    Past Surgical History:    No past surgical history on file.    Family History:    No family history on file.    Social History:  Marital Status:  Single [1]  Social History     Tobacco Use     Smoking status: Not on file   Substance Use Topics     Alcohol use: Not on file     Drug use: Not on file        Medications:    amphetamine-dextroamphetamine (ADDERALL) 30 MG per tablet  Biotin w/ Vitamins C & E (HAIR/SKIN/NAILS) 1250-7.5-7.5 MCG-MG-UNT CHEW  COPPER PO  dicyclomine (BENTYL) 20 MG tablet  Multiple Vitamins-Minerals (MULTIVITAMIN GUMMIES WOMENS) CHEW  nitroFURantoin macrocrystal-monohydrate (MACROBID) 100 MG capsule  Probiotic Product (HEALTHY COLON PO)  psyllium (METAMUCIL/KONSYL) capsule  Specialty  "Vitamins Products (GNP CENTURY ENERGY METABOLISM PO)  Wheat Dextrin (BENEFIBER) POWD          Review of Systems   Constitutional: Negative for chills, diaphoresis, fatigue and fever.   HENT: Negative for congestion.    Respiratory: Negative for cough, chest tightness and shortness of breath.    Cardiovascular: Negative for chest pain.   Gastrointestinal: Positive for nausea and vomiting. Negative for abdominal pain and diarrhea.   Genitourinary: Positive for decreased urine volume. Negative for dysuria, vaginal bleeding and vaginal discharge.   Musculoskeletal: Negative for neck pain and neck stiffness.   Skin: Negative for rash.   Neurological: Negative for weakness, light-headedness and headaches.   All other systems reviewed and are negative.      Physical Exam   BP: (!) 151/97  Pulse: 117  Temp: 98.1  F (36.7  C)  Height: 175.3 cm (5' 9\")  Weight: 90.7 kg (200 lb)  SpO2: 97 %      Physical Exam  Vitals signs and nursing note reviewed.   Constitutional:       Appearance: She is obese. She is not ill-appearing or diaphoretic.   HENT:      Head: Normocephalic and atraumatic.      Nose: Nose normal.      Mouth/Throat:      Mouth: Mucous membranes are dry.   Eyes:      Conjunctiva/sclera: Conjunctivae normal.   Cardiovascular:      Rate and Rhythm: Regular rhythm. Tachycardia present.      Pulses: Normal pulses.   Pulmonary:      Effort: Pulmonary effort is normal.   Abdominal:      Palpations: Abdomen is soft.      Tenderness: There is no abdominal tenderness. There is no guarding.   Musculoskeletal: Normal range of motion.   Skin:     General: Skin is warm and dry.      Capillary Refill: Capillary refill takes less than 2 seconds.   Neurological:      Mental Status: She is alert and oriented to person, place, and time.   Psychiatric:         Mood and Affect: Mood normal.         ED Course        Procedures                   Results for orders placed or performed during the hospital encounter of 01/18/20 (from the " past 24 hour(s))   CBC with platelets differential   Result Value Ref Range    WBC 14.6 (H) 4.0 - 11.0 10e9/L    RBC Count 4.92 3.8 - 5.2 10e12/L    Hemoglobin 15.0 11.7 - 15.7 g/dL    Hematocrit 45.2 35.0 - 47.0 %    MCV 92 78 - 100 fl    MCH 30.5 26.5 - 33.0 pg    MCHC 33.2 31.5 - 36.5 g/dL    RDW 12.2 10.0 - 15.0 %    Platelet Count 370 150 - 450 10e9/L    Diff Method Automated Method     % Neutrophils 87.2 %    % Lymphocytes 8.3 %    % Monocytes 4.1 %    % Eosinophils 0.0 %    % Basophils 0.1 %    % Immature Granulocytes 0.3 %    Nucleated RBCs 0 0 /100    Absolute Neutrophil 12.7 (H) 1.6 - 8.3 10e9/L    Absolute Lymphocytes 1.2 0.8 - 5.3 10e9/L    Absolute Monocytes 0.6 0.0 - 1.3 10e9/L    Absolute Eosinophils 0.0 0.0 - 0.7 10e9/L    Absolute Basophils 0.0 0.0 - 0.2 10e9/L    Abs Immature Granulocytes 0.1 0 - 0.4 10e9/L    Absolute Nucleated RBC 0.0    Comprehensive metabolic panel   Result Value Ref Range    Sodium 137 133 - 144 mmol/L    Potassium 4.8 3.4 - 5.3 mmol/L    Chloride 102 94 - 109 mmol/L    Carbon Dioxide 17 (L) 20 - 32 mmol/L    Anion Gap 18 (H) 3 - 14 mmol/L    Glucose 68 (L) 70 - 99 mg/dL    Urea Nitrogen 15 7 - 30 mg/dL    Creatinine 0.72 0.52 - 1.04 mg/dL    GFR Estimate >90 >60 mL/min/[1.73_m2]    GFR Estimate If Black >90 >60 mL/min/[1.73_m2]    Calcium 9.9 8.5 - 10.1 mg/dL    Bilirubin Total 0.6 0.2 - 1.3 mg/dL    Albumin 4.8 3.4 - 5.0 g/dL    Protein Total 8.7 6.8 - 8.8 g/dL    Alkaline Phosphatase 65 40 - 150 U/L    ALT 47 0 - 50 U/L    AST 37 0 - 45 U/L   UA with Microscopic   Result Value Ref Range    Color Urine Yellow     Appearance Urine Slightly Cloudy     Glucose Urine Negative NEG^Negative mg/dL    Bilirubin Urine Negative NEG^Negative    Ketones Urine 80 (A) NEG^Negative mg/dL    Specific Gravity Urine 1.017 1.003 - 1.035    Blood Urine Negative NEG^Negative    pH Urine 5.0 5.0 - 7.0 pH    Protein Albumin Urine 30 (A) NEG^Negative mg/dL    Urobilinogen mg/dL 0.0 0.0 - 2.0 mg/dL     Nitrite Urine Negative NEG^Negative    Leukocyte Esterase Urine Trace (A) NEG^Negative    Source Midstream Urine     WBC Urine 2 0 - 5 /HPF    RBC Urine 2 0 - 2 /HPF    Squamous Epithelial /HPF Urine 6 (H) 0 - 1 /HPF    Mucous Urine Present (A) NEG^Negative /LPF   HCG qualitative urine (UPT)   Result Value Ref Range    HCG Qual Urine Negative NEG^Negative       Medications   lactated ringers BOLUS 1,000 mL (0 mLs Intravenous Stopped 1/19/20 0127)     Followed by   lactated ringers BOLUS 1,000 mL (0 mLs Intravenous Stopped 1/19/20 0309)     Followed by   lactated ringers infusion (has no administration in time range)   ondansetron (ZOFRAN) injection 4 mg (has no administration in time range)   ondansetron (ZOFRAN-ODT) ODT tab 4 mg (4 mg Oral Given 1/18/20 2340)   ondansetron (ZOFRAN) injection 4 mg (4 mg Intravenous Given 1/19/20 0045)   ibuprofen (ADVIL/MOTRIN) tablet 600 mg (600 mg Oral Given 1/19/20 0132)   ondansetron (ZOFRAN) 2 MG/ML injection (4 mg  Given 1/19/20 0305)     1:41 AM Patient re-assessed: Feeling much better.  No further vomiting.  Mild nausea continues.  Tolerating ice chips now.  Still feeling dry and has not urinated.  We will continue IV fluids and reassess.    3:38 AM Patient re-assessed: Feeling much better.  Nausea controlled.  No further episodes of vomiting in the ED.  Denies abdominal pain.  No tenderness.      Assessments & Plan (with Medical Decision Making)  24-year-old female presenting for evaluation of nausea and vomiting after heavy drinking last night.  No diarrhea.  No fevers.  No abdominal pain or tenderness.  Clinically dehydrated upon arrival.  IV access obtained to assist with hydration and given IV Zofran for symptom control.  Patient given 2 L of fluid and was able to urinate.  hCG negative.  Labs with mild leukocytosis but otherwise unremarkable.  Discharged home with plan for gradual return to normal diet.  Return precautions given if symptoms worsen or new symptoms  develop     I have reviewed the nursing notes.    I have reviewed the findings, diagnosis, plan and need for follow up with the patient.       New Prescriptions    No medications on file       Final diagnoses:   Non-intractable vomiting with nausea, unspecified vomiting type       1/18/2020   [facility] EMERGENCY DEPARTMENT     Mast, Zhao Carver MD  01/19/20 9876

## 2020-03-07 ENCOUNTER — APPOINTMENT (OUTPATIENT)
Dept: CT IMAGING | Facility: CLINIC | Age: 25
End: 2020-03-07
Attending: FAMILY MEDICINE
Payer: COMMERCIAL

## 2020-03-07 ENCOUNTER — HOSPITAL ENCOUNTER (EMERGENCY)
Facility: CLINIC | Age: 25
Discharge: HOME | End: 2020-03-07
Attending: FAMILY MEDICINE | Admitting: FAMILY MEDICINE
Payer: COMMERCIAL

## 2020-03-07 VITALS
SYSTOLIC BLOOD PRESSURE: 117 MMHG | RESPIRATION RATE: 16 BRPM | TEMPERATURE: 97.2 F | DIASTOLIC BLOOD PRESSURE: 76 MMHG | OXYGEN SATURATION: 99 % | HEART RATE: 115 BPM | WEIGHT: 200 LBS | BODY MASS INDEX: 29.53 KG/M2

## 2020-03-07 DIAGNOSIS — E87.20 LACTIC ACIDOSIS: ICD-10-CM

## 2020-03-07 DIAGNOSIS — K52.9 GASTROENTERITIS: ICD-10-CM

## 2020-03-07 DIAGNOSIS — R74.8 ABNORMAL TRANSAMINASES: ICD-10-CM

## 2020-03-07 DIAGNOSIS — D72.829 LEUKOCYTOSIS, UNSPECIFIED TYPE: ICD-10-CM

## 2020-03-07 LAB
ALBUMIN SERPL-MCNC: 4.4 G/DL (ref 3.4–5)
ALBUMIN UR-MCNC: 30 MG/DL
ALP SERPL-CCNC: 61 U/L (ref 40–150)
ALT SERPL W P-5'-P-CCNC: 198 U/L (ref 0–50)
AMPHETAMINES UR QL SCN: NEGATIVE
ANION GAP SERPL CALCULATED.3IONS-SCNC: 14 MMOL/L (ref 3–14)
APAP SERPL-MCNC: <2 MG/L (ref 10–20)
APPEARANCE UR: ABNORMAL
AST SERPL W P-5'-P-CCNC: 130 U/L (ref 0–45)
BARBITURATES UR QL: NEGATIVE
BASOPHILS # BLD AUTO: 0 10E9/L (ref 0–0.2)
BASOPHILS NFR BLD AUTO: 0.2 %
BENZODIAZ UR QL: POSITIVE
BILIRUB DIRECT SERPL-MCNC: 0.2 MG/DL (ref 0–0.2)
BILIRUB SERPL-MCNC: 1.2 MG/DL (ref 0.2–1.3)
BILIRUB UR QL STRIP: NEGATIVE
BUN SERPL-MCNC: 14 MG/DL (ref 7–30)
CALCIUM SERPL-MCNC: 9.7 MG/DL (ref 8.5–10.1)
CANNABINOIDS UR QL SCN: POSITIVE
CHLORIDE SERPL-SCNC: 103 MMOL/L (ref 94–109)
CO2 SERPL-SCNC: 21 MMOL/L (ref 20–32)
COCAINE UR QL: NEGATIVE
COLOR UR AUTO: ABNORMAL
CREAT SERPL-MCNC: 0.75 MG/DL (ref 0.52–1.04)
DIFFERENTIAL METHOD BLD: ABNORMAL
EOSINOPHIL # BLD AUTO: 0.1 10E9/L (ref 0–0.7)
EOSINOPHIL NFR BLD AUTO: 0.5 %
ERYTHROCYTE [DISTWIDTH] IN BLOOD BY AUTOMATED COUNT: 12.5 % (ref 10–15)
ETHANOL SERPL-MCNC: <0.01 G/DL
GFR SERPL CREATININE-BSD FRML MDRD: >90 ML/MIN/{1.73_M2}
GLUCOSE SERPL-MCNC: 112 MG/DL (ref 70–99)
GLUCOSE UR STRIP-MCNC: NEGATIVE MG/DL
HCG SERPL QL: NEGATIVE
HCT VFR BLD AUTO: 49.1 % (ref 35–47)
HETEROPH AB SER QL: NEGATIVE
HGB BLD-MCNC: 16.6 G/DL (ref 11.7–15.7)
HGB UR QL STRIP: NEGATIVE
IMM GRANULOCYTES # BLD: 0.1 10E9/L (ref 0–0.4)
IMM GRANULOCYTES NFR BLD: 0.3 %
KETONES UR STRIP-MCNC: 80 MG/DL
LACTATE BLD-SCNC: 0.7 MMOL/L (ref 0.7–2)
LACTATE BLD-SCNC: 2.9 MMOL/L (ref 0.7–2)
LEUKOCYTE ESTERASE UR QL STRIP: NEGATIVE
LIPASE SERPL-CCNC: 156 U/L (ref 73–393)
LYMPHOCYTES # BLD AUTO: 0.7 10E9/L (ref 0.8–5.3)
LYMPHOCYTES NFR BLD AUTO: 4.6 %
MCH RBC QN AUTO: 30.5 PG (ref 26.5–33)
MCHC RBC AUTO-ENTMCNC: 33.8 G/DL (ref 31.5–36.5)
MCV RBC AUTO: 90 FL (ref 78–100)
MONOCYTES # BLD AUTO: 1 10E9/L (ref 0–1.3)
MONOCYTES NFR BLD AUTO: 6.3 %
MUCOUS THREADS #/AREA URNS LPF: PRESENT /LPF
NEUTROPHILS # BLD AUTO: 13.5 10E9/L (ref 1.6–8.3)
NEUTROPHILS NFR BLD AUTO: 88.1 %
NITRATE UR QL: NEGATIVE
NRBC # BLD AUTO: 0 10*3/UL
NRBC BLD AUTO-RTO: 0 /100
OPIATES UR QL SCN: NEGATIVE
PCP UR QL SCN: NEGATIVE
PH UR STRIP: 5 PH (ref 5–7)
PLATELET # BLD AUTO: 272 10E9/L (ref 150–450)
POTASSIUM SERPL-SCNC: 4.1 MMOL/L (ref 3.4–5.3)
PROT SERPL-MCNC: 8.1 G/DL (ref 6.8–8.8)
RBC # BLD AUTO: 5.45 10E12/L (ref 3.8–5.2)
RBC #/AREA URNS AUTO: 1 /HPF (ref 0–2)
SODIUM SERPL-SCNC: 138 MMOL/L (ref 133–144)
SOURCE: ABNORMAL
SP GR UR STRIP: 1.02 (ref 1–1.03)
SQUAMOUS #/AREA URNS AUTO: 7 /HPF (ref 0–1)
UROBILINOGEN UR STRIP-MCNC: 2 MG/DL (ref 0–2)
WBC # BLD AUTO: 15.3 10E9/L (ref 4–11)
WBC #/AREA URNS AUTO: 4 /HPF (ref 0–5)

## 2020-03-07 PROCEDURE — 96375 TX/PRO/DX INJ NEW DRUG ADDON: CPT | Performed by: FAMILY MEDICINE

## 2020-03-07 PROCEDURE — 86709 HEPATITIS A IGM ANTIBODY: CPT | Performed by: FAMILY MEDICINE

## 2020-03-07 PROCEDURE — 83690 ASSAY OF LIPASE: CPT | Performed by: FAMILY MEDICINE

## 2020-03-07 PROCEDURE — 86803 HEPATITIS C AB TEST: CPT | Performed by: FAMILY MEDICINE

## 2020-03-07 PROCEDURE — 80307 DRUG TEST PRSMV CHEM ANLYZR: CPT | Performed by: FAMILY MEDICINE

## 2020-03-07 PROCEDURE — 25000128 H RX IP 250 OP 636: Performed by: FAMILY MEDICINE

## 2020-03-07 PROCEDURE — 80329 ANALGESICS NON-OPIOID 1 OR 2: CPT | Performed by: FAMILY MEDICINE

## 2020-03-07 PROCEDURE — 25000125 ZZHC RX 250: Performed by: FAMILY MEDICINE

## 2020-03-07 PROCEDURE — 99285 EMERGENCY DEPT VISIT HI MDM: CPT | Mod: 25 | Performed by: FAMILY MEDICINE

## 2020-03-07 PROCEDURE — 80320 DRUG SCREEN QUANTALCOHOLS: CPT | Performed by: FAMILY MEDICINE

## 2020-03-07 PROCEDURE — 85025 COMPLETE CBC W/AUTO DIFF WBC: CPT | Performed by: FAMILY MEDICINE

## 2020-03-07 PROCEDURE — 25000132 ZZH RX MED GY IP 250 OP 250 PS 637: Performed by: FAMILY MEDICINE

## 2020-03-07 PROCEDURE — 93005 ELECTROCARDIOGRAM TRACING: CPT | Performed by: FAMILY MEDICINE

## 2020-03-07 PROCEDURE — 80076 HEPATIC FUNCTION PANEL: CPT | Performed by: FAMILY MEDICINE

## 2020-03-07 PROCEDURE — 86308 HETEROPHILE ANTIBODY SCREEN: CPT | Performed by: FAMILY MEDICINE

## 2020-03-07 PROCEDURE — 25800030 ZZH RX IP 258 OP 636: Performed by: FAMILY MEDICINE

## 2020-03-07 PROCEDURE — 81001 URINALYSIS AUTO W/SCOPE: CPT | Performed by: FAMILY MEDICINE

## 2020-03-07 PROCEDURE — G0499 HEPB SCREEN HIGH RISK INDIV: HCPCS | Performed by: FAMILY MEDICINE

## 2020-03-07 PROCEDURE — 80048 BASIC METABOLIC PNL TOTAL CA: CPT | Performed by: FAMILY MEDICINE

## 2020-03-07 PROCEDURE — 96374 THER/PROPH/DIAG INJ IV PUSH: CPT | Mod: 59 | Performed by: FAMILY MEDICINE

## 2020-03-07 PROCEDURE — 74177 CT ABD & PELVIS W/CONTRAST: CPT

## 2020-03-07 PROCEDURE — 96361 HYDRATE IV INFUSION ADD-ON: CPT | Performed by: FAMILY MEDICINE

## 2020-03-07 PROCEDURE — 84703 CHORIONIC GONADOTROPIN ASSAY: CPT | Performed by: FAMILY MEDICINE

## 2020-03-07 PROCEDURE — 83605 ASSAY OF LACTIC ACID: CPT | Performed by: FAMILY MEDICINE

## 2020-03-07 PROCEDURE — 93010 ELECTROCARDIOGRAM REPORT: CPT | Mod: Z6 | Performed by: FAMILY MEDICINE

## 2020-03-07 RX ORDER — IOPAMIDOL 755 MG/ML
98 INJECTION, SOLUTION INTRAVASCULAR ONCE
Status: COMPLETED | OUTPATIENT
Start: 2020-03-07 | End: 2020-03-07

## 2020-03-07 RX ORDER — ONDANSETRON 4 MG/1
4 TABLET, ORALLY DISINTEGRATING ORAL EVERY 8 HOURS PRN
Qty: 10 TABLET | Refills: 0 | Status: SHIPPED | OUTPATIENT
Start: 2020-03-07 | End: 2020-04-02

## 2020-03-07 RX ORDER — LORAZEPAM 2 MG/ML
1 INJECTION INTRAMUSCULAR ONCE
Status: COMPLETED | OUTPATIENT
Start: 2020-03-07 | End: 2020-03-07

## 2020-03-07 RX ORDER — ONDANSETRON 2 MG/ML
4 INJECTION INTRAMUSCULAR; INTRAVENOUS EVERY 30 MIN PRN
Status: DISCONTINUED | OUTPATIENT
Start: 2020-03-07 | End: 2020-03-07 | Stop reason: HOSPADM

## 2020-03-07 RX ORDER — SODIUM CHLORIDE 9 MG/ML
1000 INJECTION, SOLUTION INTRAVENOUS CONTINUOUS
Status: DISCONTINUED | OUTPATIENT
Start: 2020-03-07 | End: 2020-03-07 | Stop reason: HOSPADM

## 2020-03-07 RX ORDER — CAPSAICIN 0.025 %
CREAM (GRAM) TOPICAL 3 TIMES DAILY
Status: DISCONTINUED | OUTPATIENT
Start: 2020-03-07 | End: 2020-03-07 | Stop reason: HOSPADM

## 2020-03-07 RX ORDER — KETOROLAC TROMETHAMINE 15 MG/ML
15 INJECTION, SOLUTION INTRAMUSCULAR; INTRAVENOUS ONCE
Status: COMPLETED | OUTPATIENT
Start: 2020-03-07 | End: 2020-03-07

## 2020-03-07 RX ADMIN — IOPAMIDOL 98 ML: 755 INJECTION, SOLUTION INTRAVENOUS at 11:01

## 2020-03-07 RX ADMIN — ONDANSETRON 4 MG: 2 INJECTION INTRAMUSCULAR; INTRAVENOUS at 09:21

## 2020-03-07 RX ADMIN — SODIUM CHLORIDE 1000 ML: 9 INJECTION, SOLUTION INTRAVENOUS at 11:23

## 2020-03-07 RX ADMIN — SODIUM CHLORIDE 65 ML: 9 INJECTION, SOLUTION INTRAVENOUS at 11:01

## 2020-03-07 RX ADMIN — SODIUM CHLORIDE 1000 ML: 9 INJECTION, SOLUTION INTRAVENOUS at 09:20

## 2020-03-07 RX ADMIN — KETOROLAC TROMETHAMINE 15 MG: 15 INJECTION, SOLUTION INTRAMUSCULAR; INTRAVENOUS at 11:55

## 2020-03-07 RX ADMIN — LORAZEPAM 1 MG: 2 INJECTION INTRAMUSCULAR; INTRAVENOUS at 09:21

## 2020-03-07 RX ADMIN — CAPSAICIN: 0.25 CREAM TOPICAL at 12:35

## 2020-03-07 ASSESSMENT — ENCOUNTER SYMPTOMS
FREQUENCY: 0
FEVER: 0
VOMITING: 1
CHILLS: 0
PALPITATIONS: 0
HEADACHES: 0
WHEEZING: 0
HEMATURIA: 0
NAUSEA: 1
DYSURIA: 0
SHORTNESS OF BREATH: 0
DIAPHORESIS: 0
CONSTIPATION: 0
DIARRHEA: 0
SORE THROAT: 0
ABDOMINAL PAIN: 1
BLOOD IN STOOL: 0
COUGH: 0
SINUS PRESSURE: 0

## 2020-03-07 NOTE — ED AVS SNAPSHOT
[facility] Emergency Department  [address] [address]  Phone:  [phone]  Fax:  [phone]                                    Ana Murphy   MRN: [id number]    Department:  [facility] Emergency Department   Date of Visit:  3/7/2020           After Visit Summary Signature Page    I have received my discharge instructions, and my questions have been answered. I have discussed any challenges I see with this plan with the nurse or doctor.    ..........................................................................................................................................  Patient/Patient Representative Signature      ..........................................................................................................................................  Patient Representative Print Name and Relationship to Patient    ..................................................               ................................................  Date                                   Time    ..........................................................................................................................................  Reviewed by Signature/Title    ...................................................              ..............................................  Date                                               Time          22EPIC Rev 08/18

## 2020-03-07 NOTE — ED NOTES
Pt back from bathroom states she is feeling a little better. Pt unable to drink for CT because of vomiting Dr. Oakley aware.

## 2020-03-07 NOTE — DISCHARGE INSTRUCTIONS
ICD-10-CM    1. Abnormal transaminases R74.8     await hepatitis tests. may be due to fattyy liver. avoid alcohol, follow-up recheck clinic and discuss fatty liver   2. Lactic acidosis E87.2     this normalized with iv fluids.  return for fever, worseining   3. Gastroenteritis K52.9     take zofran as needed. expect additional diarrhea today. stay hydrated.   4. Leukocytosis, unspecified type D72.829     no serious findings on exam, CT.  likely due to diarrhea

## 2020-03-07 NOTE — ED PROVIDER NOTES
"  History     Chief Complaint   Patient presents with     Abdominal Pain     R sided abd pain n/v, woke with sx this am.      HPI  Ana Murphy is a 24 year old female with history of alcohol abuse, and polysubstance abuse including amphetamine and cannabis who presents to the Emergency Department with abdominal pain and vomiting. Onset of vomiting at 0530 with persistent vomiting throughout the morning. Now with some streaks of blood noted in \"foamy\" emesis. No bilious emesis. Pain described as a band sensation across the top of the abdomen radiating into the back. Patient anxious, feeling like she is \"hallucinating\" describes she is unable to focus for a brief period, forgets where she is but then remembers. No recent abdominal trauma or injury. No history of abdominal surgeries. No history of pancreatitis or cholelithiasis. Admits to 2-3 mixed drinks last night. Denies marijuana or other illicit substances. Evaluated in February 2020 for intractable vomiting and dehydration after heavy EtOH use.     Allergies:  Allergies   Allergen Reactions     Sulfa Drugs Hives       Problem List:    There are no active problems to display for this patient.       Past Medical History:    History reviewed. No pertinent past medical history.    Past Surgical History:    History reviewed. No pertinent surgical history.    Family History:    History reviewed. No pertinent family history.    Social History:  Marital Status:  Single [1]  Social History     Tobacco Use     Smoking status: Current Every Day Smoker     Packs/day: 0.50     Types: Cigarettes     Smokeless tobacco: Never Used   Substance Use Topics     Alcohol use: Yes     Alcohol/week: 21.0 standard drinks     Types: 21 Cans of beer per week     Drug use: Not Currently     Types: Marijuana        Medications:    amphetamine-dextroamphetamine (ADDERALL) 30 MG per tablet  Biotin w/ Vitamins C & E (HAIR/SKIN/NAILS) 1250-7.5-7.5 MCG-MG-UNT CHEW  COPPER PO  dicyclomine " (BENTYL) 20 MG tablet  Multiple Vitamins-Minerals (MULTIVITAMIN GUMMIES WOMENS) CHEW  nitroFURantoin macrocrystal-monohydrate (MACROBID) 100 MG capsule  Probiotic Product (HEALTHY COLON PO)  psyllium (METAMUCIL/KONSYL) capsule  Specialty Vitamins Products (GNP CENTURY ENERGY METABOLISM PO)  Wheat Dextrin (BENEFIBER) POWD        Review of Systems   Constitutional: Negative for chills, diaphoresis and fever.   HENT: Negative for ear pain, sinus pressure and sore throat.    Eyes: Negative for visual disturbance.   Respiratory: Negative for cough, shortness of breath and wheezing.    Cardiovascular: Negative for chest pain and palpitations.   Gastrointestinal: Positive for abdominal pain, nausea and vomiting. Negative for blood in stool, constipation and diarrhea.   Genitourinary: Negative for dysuria, frequency, hematuria and urgency.   Skin: Negative for rash.   Neurological: Negative for headaches.   All other systems reviewed and are negative.      Physical Exam   BP: 121/59  Pulse: 146  Temp: 97.2  F (36.2  C)  Resp: 16  Weight: 90.7 kg (200 lb)  SpO2: 98 %      Physical Exam  Constitutional:       General: She is in acute distress.      Appearance: She is ill-appearing. She is not toxic-appearing or diaphoretic.   HENT:      Mouth/Throat:      Mouth: Mucous membranes are moist.      Pharynx: Oropharynx is clear.   Cardiovascular:      Rate and Rhythm: Regular rhythm. Tachycardia present.   Pulmonary:      Effort: Pulmonary effort is normal.      Breath sounds: Normal breath sounds. No stridor. No wheezing, rhonchi or rales.   Abdominal:      General: Abdomen is flat. Bowel sounds are normal. There is no distension.      Palpations: Abdomen is soft.      Tenderness: There is generalized abdominal tenderness (minimal). There is no right CVA tenderness, left CVA tenderness, guarding or rebound. Negative signs include Henderson's sign, Rovsing's sign and McBurney's sign.      Hernia: No hernia is present.    Musculoskeletal:      Right lower leg: No edema.      Left lower leg: No edema.   Skin:     Coloration: Skin is not pale.      Findings: No rash.   Neurological:      Mental Status: She is alert.       ED Course        Procedures                 EKG Interpretation:      Interpreted by Alexander Oakley  EKG done at 0923 hrs demonstrates sinus rhythm at 105 bpm with normal axis. No ST change. No T wave changes. Normal R progression. No Q waves. Normal intervals. Normal conduction. No ectopy.   Impression: Sinus rhythm at 105 bpm. no old ekg      Critical Care time:  none               Results for orders placed or performed during the hospital encounter of 03/07/20 (from the past 24 hour(s))   CBC with platelets differential   Result Value Ref Range    WBC 15.3 (H) 4.0 - 11.0 10e9/L    RBC Count 5.45 (H) 3.8 - 5.2 10e12/L    Hemoglobin 16.6 (H) 11.7 - 15.7 g/dL    Hematocrit 49.1 (H) 35.0 - 47.0 %    MCV 90 78 - 100 fl    MCH 30.5 26.5 - 33.0 pg    MCHC 33.8 31.5 - 36.5 g/dL    RDW 12.5 10.0 - 15.0 %    Platelet Count 272 150 - 450 10e9/L    Diff Method Automated Method     % Neutrophils 88.1 %    % Lymphocytes 4.6 %    % Monocytes 6.3 %    % Eosinophils 0.5 %    % Basophils 0.2 %    % Immature Granulocytes 0.3 %    Nucleated RBCs 0 0 /100    Absolute Neutrophil 13.5 (H) 1.6 - 8.3 10e9/L    Absolute Lymphocytes 0.7 (L) 0.8 - 5.3 10e9/L    Absolute Monocytes 1.0 0.0 - 1.3 10e9/L    Absolute Eosinophils 0.1 0.0 - 0.7 10e9/L    Absolute Basophils 0.0 0.0 - 0.2 10e9/L    Abs Immature Granulocytes 0.1 0 - 0.4 10e9/L    Absolute Nucleated RBC 0.0    Basic metabolic panel   Result Value Ref Range    Sodium 138 133 - 144 mmol/L    Potassium 4.1 3.4 - 5.3 mmol/L    Chloride 103 94 - 109 mmol/L    Carbon Dioxide 21 20 - 32 mmol/L    Anion Gap 14 3 - 14 mmol/L    Glucose 112 (H) 70 - 99 mg/dL    Urea Nitrogen 14 7 - 30 mg/dL    Creatinine 0.75 0.52 - 1.04 mg/dL    GFR Estimate >90 >60 mL/min/[1.73_m2]    GFR Estimate If Black >90  >60 mL/min/[1.73_m2]    Calcium 9.7 8.5 - 10.1 mg/dL   UA with Microscopic   Result Value Ref Range    Color Urine Jenise     Appearance Urine Slightly Cloudy     Glucose Urine Negative NEG^Negative mg/dL    Bilirubin Urine Negative NEG^Negative    Ketones Urine 80 (A) NEG^Negative mg/dL    Specific Gravity Urine 1.025 1.003 - 1.035    Blood Urine Negative NEG^Negative    pH Urine 5.0 5.0 - 7.0 pH    Protein Albumin Urine 30 (A) NEG^Negative mg/dL    Urobilinogen mg/dL 2.0 0.0 - 2.0 mg/dL    Nitrite Urine Negative NEG^Negative    Leukocyte Esterase Urine Negative NEG^Negative    Source Midstream Urine     WBC Urine 4 0 - 5 /HPF    RBC Urine 1 0 - 2 /HPF    Squamous Epithelial /HPF Urine 7 (H) 0 - 1 /HPF    Mucous Urine Present (A) NEG^Negative /LPF   Lactic acid whole blood   Result Value Ref Range    Lactic Acid 2.9 (H) 0.7 - 2.0 mmol/L   Drug abuse screen urine   Result Value Ref Range    Amphetamine Qual Urine Negative NEG^Negative    Barbiturates Qual Urine Negative NEG^Negative    Benzodiazepine Qual Urine Positive (A) NEG^Negative    Cannabinoids Qual Urine Positive (A) NEG^Negative    Cocaine Qual Urine Negative NEG^Negative    Opiates Qualitative Urine Negative NEG^Negative    PCP Qual Urine Negative NEG^Negative   HCG qualitative   Result Value Ref Range    HCG Qualitative Serum Negative NEG^Negative   Alcohol ethyl   Result Value Ref Range    Ethanol g/dL <0.01 <0.01 g/dL   Hepatic panel   Result Value Ref Range    Bilirubin Direct 0.2 0.0 - 0.2 mg/dL    Bilirubin Total 1.2 0.2 - 1.3 mg/dL    Albumin 4.4 3.4 - 5.0 g/dL    Protein Total 8.1 6.8 - 8.8 g/dL    Alkaline Phosphatase 61 40 - 150 U/L     (H) 0 - 50 U/L     (H) 0 - 45 U/L   Lipase   Result Value Ref Range    Lipase 156 73 - 393 U/L   CT Abdomen Pelvis w Contrast    Narrative    CT ABDOMEN PELVIS W CONTRAST 3/7/2020 11:10 AM    CLINICAL HISTORY: abd pain, lower quadrants and increased wbc    TECHNIQUE: CT scan of the abdomen and pelvis  was performed following  injection of IV contrast. Multiplanar reformats were obtained. Dose  reduction techniques were used.  CONTRAST: 98 mL Isovue-370    COMPARISON: CT abdomen and pelvis 4/9/2019    FINDINGS:   LOWER CHEST: Normal.    HEPATOBILIARY: Moderate diffuse hepatic steatosis. Gallbladder  unremarkable. No biliary ductal dilatation.    PANCREAS: Normal.    SPLEEN: Normal.    ADRENAL GLANDS: Normal.    KIDNEYS/BLADDER: Normal.    BOWEL: Normal.    PELVIC ORGANS: Normal.    ADDITIONAL FINDINGS: None.    MUSCULOSKELETAL: Normal.      Impression    IMPRESSION:   1.  No acute abdominal or pelvic process.  2.  Moderate hepatic steatosis.    SOFIE HAMILTON MD   Acetaminophen level   Result Value Ref Range    Acetaminophen Level <2 mg/L   Mono   Result Value Ref Range    Mononucleosis Screen Negative NEG^Negative   Lactic acid whole blood   Result Value Ref Range    Lactic Acid 0.7 0.7 - 2.0 mmol/L       Medications   LORazepam (ATIVAN) injection 1 mg (1 mg Intravenous Given 3/7/20 0921)   0.9% sodium chloride BOLUS (0 mLs Intravenous Stopped 3/7/20 1122)   iopamidol (ISOVUE-370) solution 98 mL (98 mLs Intravenous Given 3/7/20 1101)   sodium chloride 0.9 % bag 500mL for CT scan flush use (65 mLs Intravenous Given 3/7/20 1101)   ketorolac (TORADOL) injection 15 mg (15 mg Intravenous Given 3/7/20 1155)       9:04 AM patient assessed.     Assessments & Plan (with Medical Decision Making)     MDM: Ana Murphy is a 24 year old female presenting with a history of prior polysubstance abuse now with vomiting and abdominal pain this morning.  Difficult initial history as the patient had difficulty tracking my questions and felt as if she might be hallucinating, some hyperventilation.  Heavy alcohol use last evening and has been binging on alcohol throughout the week.  Denied initially cannabinoids but did use him about a month ago.  Her initial examination demonstrated a tachycardia normal blood pressure and  afebrile with a relatively benign abdomen but the white blood cell count and lactic acid were both elevated and therefore CT abdomen of the pelvis was pursued in addition to laboratory testing.  Initial testing was based on the differential diagnosis of appendicitis, ureteral stone, acute gastroenteritis, colitis, inflammatory bowel, peptic ulcer disease, cholelithiasis, drug ingestion.  The CT of the abdomen and pelvis demonstrated fatty liver which likely correlates with her increased use of alcohol.  The patient calmed with Ativan and was markedly improved after this.  She also improved with IV fluids and Toradol.  Her ALT is increased more than her AST although both are in the 100-200 range.  She denied recent atypical ingestions no history of hepatitis.  However given these elevations I did recommend that we obtain hepatitis serology as well as Tylenol level.  She declines HIV syphilis testing at the same time which was offered unrelated to the liver function test abnormalities.  The laboratory testing for hepatitis serologies pending the other testing was reassuring.  We discussed following up in clinic related to fatty liver as well as the ALT AST changes need for recheck.  Discussed home management of gastroenteritis-like symptoms and we also discussed cyclical hyperemesis which does not appear to be affecting this particular episode more likely the heavy use of alcohol which I have cautioned her against especially given that the finding of fatty liver at the age of 24 is quite concerning and premature.    I have reviewed the nursing notes.    I have reviewed the findings, diagnosis, plan and need for follow up with the patient.       New Prescriptions    No medications on file       Final diagnoses:   Abnormal transaminases - await hepatitis tests. may be due to fattyy liver. avoid alcohol, follow-up recheck clinic and discuss fatty liver   Lactic acidosis - this normalized with iv fluids.  return for  fever, worseining   Gastroenteritis - take zofran as needed. expect additional diarrhea today. stay hydrated.   Leukocytosis, unspecified type - no serious findings on exam, CT.  likely due to diarrhea     This document serves as a record of the services and decisions personally performed and made by Alexander Oakley MD. It was created on his behalf by Lilia Qiu, a trained medical scribe. The creation of this document is based the provider's statements to the medical scribe.  Lilia Qiu 9:11 AM 3/7/2020    Provider:   The information in this document, created by the medical scribe for me, accurately reflects the services I personally performed and the decisions made by me. I have reviewed and approved this document for accuracy prior to leaving the patient care area.  Alexander Oakley MD 9:11 AM 3/7/2020    3/7/2020   [facility] EMERGENCY DEPARTMENT     Alexander Oakley MD  03/07/20 1911

## 2020-03-09 LAB
HAV IGM SERPL QL IA: NONREACTIVE
HBV SURFACE AG SERPL QL IA: NONREACTIVE
HCV AB SERPL QL IA: NONREACTIVE

## 2020-03-21 ENCOUNTER — HOSPITAL ENCOUNTER (EMERGENCY)
Facility: CLINIC | Age: 25
Discharge: HOME | End: 2020-03-21
Attending: FAMILY MEDICINE | Admitting: FAMILY MEDICINE
Payer: COMMERCIAL

## 2020-03-21 VITALS
WEIGHT: 180 LBS | OXYGEN SATURATION: 100 % | BODY MASS INDEX: 26.66 KG/M2 | TEMPERATURE: 98.4 F | SYSTOLIC BLOOD PRESSURE: 122 MMHG | DIASTOLIC BLOOD PRESSURE: 88 MMHG | HEART RATE: 108 BPM | RESPIRATION RATE: 18 BRPM | HEIGHT: 69 IN

## 2020-03-21 DIAGNOSIS — F10.10 ALCOHOL CONSUMPTION BINGE DRINKING: ICD-10-CM

## 2020-03-21 DIAGNOSIS — N30.01 ACUTE CYSTITIS WITH HEMATURIA: ICD-10-CM

## 2020-03-21 DIAGNOSIS — K59.00 CONSTIPATION, UNSPECIFIED CONSTIPATION TYPE: ICD-10-CM

## 2020-03-21 LAB
ALBUMIN SERPL-MCNC: 4.4 G/DL (ref 3.4–5)
ALBUMIN UR-MCNC: 30 MG/DL
ALP SERPL-CCNC: 62 U/L (ref 40–150)
ALT SERPL W P-5'-P-CCNC: 371 U/L (ref 0–50)
AMPHETAMINES UR QL SCN: NEGATIVE
ANION GAP SERPL CALCULATED.3IONS-SCNC: 13 MMOL/L (ref 3–14)
APAP SERPL-MCNC: <2 MG/L (ref 10–20)
APPEARANCE UR: ABNORMAL
AST SERPL W P-5'-P-CCNC: 198 U/L (ref 0–45)
BACTERIA #/AREA URNS HPF: ABNORMAL /HPF
BARBITURATES UR QL: NEGATIVE
BASOPHILS # BLD AUTO: 0.1 10E9/L (ref 0–0.2)
BASOPHILS NFR BLD AUTO: 0.3 %
BENZODIAZ UR QL: NEGATIVE
BILIRUB SERPL-MCNC: 0.8 MG/DL (ref 0.2–1.3)
BILIRUB UR QL STRIP: NEGATIVE
BUN SERPL-MCNC: 7 MG/DL (ref 7–30)
CALCIUM SERPL-MCNC: 9.2 MG/DL (ref 8.5–10.1)
CANNABINOIDS UR QL SCN: NEGATIVE
CHLORIDE SERPL-SCNC: 99 MMOL/L (ref 94–109)
CO2 SERPL-SCNC: 22 MMOL/L (ref 20–32)
COCAINE UR QL: NEGATIVE
COLOR UR AUTO: YELLOW
CREAT SERPL-MCNC: 0.71 MG/DL (ref 0.52–1.04)
DIFFERENTIAL METHOD BLD: ABNORMAL
EOSINOPHIL # BLD AUTO: 0 10E9/L (ref 0–0.7)
EOSINOPHIL NFR BLD AUTO: 0.2 %
ERYTHROCYTE [DISTWIDTH] IN BLOOD BY AUTOMATED COUNT: 13.9 % (ref 10–15)
ETHANOL SERPL-MCNC: 0.02 G/DL
GFR SERPL CREATININE-BSD FRML MDRD: >90 ML/MIN/{1.73_M2}
GLUCOSE SERPL-MCNC: 69 MG/DL (ref 70–99)
GLUCOSE UR STRIP-MCNC: NEGATIVE MG/DL
HCG SERPL QL: NEGATIVE
HCT VFR BLD AUTO: 45.2 % (ref 35–47)
HGB BLD-MCNC: 14.9 G/DL (ref 11.7–15.7)
HGB UR QL STRIP: ABNORMAL
HYALINE CASTS #/AREA URNS LPF: 9 /LPF (ref 0–2)
IMM GRANULOCYTES # BLD: 0.2 10E9/L (ref 0–0.4)
IMM GRANULOCYTES NFR BLD: 1.1 %
KETONES UR STRIP-MCNC: 20 MG/DL
LEUKOCYTE ESTERASE UR QL STRIP: ABNORMAL
LIPASE SERPL-CCNC: 93 U/L (ref 73–393)
LYMPHOCYTES # BLD AUTO: 1.5 10E9/L (ref 0.8–5.3)
LYMPHOCYTES NFR BLD AUTO: 10.1 %
MCH RBC QN AUTO: 30.8 PG (ref 26.5–33)
MCHC RBC AUTO-ENTMCNC: 33 G/DL (ref 31.5–36.5)
MCV RBC AUTO: 94 FL (ref 78–100)
MONOCYTES # BLD AUTO: 0.9 10E9/L (ref 0–1.3)
MONOCYTES NFR BLD AUTO: 5.9 %
MUCOUS THREADS #/AREA URNS LPF: PRESENT /LPF
NEUTROPHILS # BLD AUTO: 12.3 10E9/L (ref 1.6–8.3)
NEUTROPHILS NFR BLD AUTO: 82.4 %
NITRATE UR QL: NEGATIVE
NRBC # BLD AUTO: 0 10*3/UL
NRBC BLD AUTO-RTO: 0 /100
OPIATES UR QL SCN: NEGATIVE
PCP UR QL SCN: NEGATIVE
PH UR STRIP: 8 PH (ref 5–7)
PLATELET # BLD AUTO: 375 10E9/L (ref 150–450)
POTASSIUM SERPL-SCNC: 4.1 MMOL/L (ref 3.4–5.3)
PROT SERPL-MCNC: 8 G/DL (ref 6.8–8.8)
RBC # BLD AUTO: 4.83 10E12/L (ref 3.8–5.2)
RBC #/AREA URNS AUTO: 102 /HPF (ref 0–2)
SALICYLATES SERPL-MCNC: 2 MG/DL
SODIUM SERPL-SCNC: 134 MMOL/L (ref 133–144)
SOURCE: ABNORMAL
SP GR UR STRIP: 1.01 (ref 1–1.03)
SQUAMOUS #/AREA URNS AUTO: 2 /HPF (ref 0–1)
TRANS CELLS #/AREA URNS HPF: 2 /HPF (ref 0–1)
UROBILINOGEN UR STRIP-MCNC: 0 MG/DL (ref 0–2)
WBC # BLD AUTO: 14.9 10E9/L (ref 4–11)
WBC #/AREA URNS AUTO: >182 /HPF (ref 0–5)

## 2020-03-21 PROCEDURE — 80320 DRUG SCREEN QUANTALCOHOLS: CPT | Performed by: FAMILY MEDICINE

## 2020-03-21 PROCEDURE — 80053 COMPREHEN METABOLIC PANEL: CPT | Performed by: FAMILY MEDICINE

## 2020-03-21 PROCEDURE — 96376 TX/PRO/DX INJ SAME DRUG ADON: CPT

## 2020-03-21 PROCEDURE — 87186 SC STD MICRODIL/AGAR DIL: CPT | Performed by: FAMILY MEDICINE

## 2020-03-21 PROCEDURE — 81001 URINALYSIS AUTO W/SCOPE: CPT | Performed by: FAMILY MEDICINE

## 2020-03-21 PROCEDURE — 87088 URINE BACTERIA CULTURE: CPT | Performed by: FAMILY MEDICINE

## 2020-03-21 PROCEDURE — 84703 CHORIONIC GONADOTROPIN ASSAY: CPT | Performed by: FAMILY MEDICINE

## 2020-03-21 PROCEDURE — 25800030 ZZH RX IP 258 OP 636: Performed by: FAMILY MEDICINE

## 2020-03-21 PROCEDURE — 99285 EMERGENCY DEPT VISIT HI MDM: CPT | Mod: 25

## 2020-03-21 PROCEDURE — 85025 COMPLETE CBC W/AUTO DIFF WBC: CPT | Performed by: FAMILY MEDICINE

## 2020-03-21 PROCEDURE — 96375 TX/PRO/DX INJ NEW DRUG ADDON: CPT

## 2020-03-21 PROCEDURE — 99285 EMERGENCY DEPT VISIT HI MDM: CPT | Mod: Z6 | Performed by: FAMILY MEDICINE

## 2020-03-21 PROCEDURE — 80329 ANALGESICS NON-OPIOID 1 OR 2: CPT | Performed by: FAMILY MEDICINE

## 2020-03-21 PROCEDURE — 80307 DRUG TEST PRSMV CHEM ANLYZR: CPT | Performed by: FAMILY MEDICINE

## 2020-03-21 PROCEDURE — 96361 HYDRATE IV INFUSION ADD-ON: CPT

## 2020-03-21 PROCEDURE — 25000132 ZZH RX MED GY IP 250 OP 250 PS 637: Performed by: FAMILY MEDICINE

## 2020-03-21 PROCEDURE — 83690 ASSAY OF LIPASE: CPT | Performed by: FAMILY MEDICINE

## 2020-03-21 PROCEDURE — 25000128 H RX IP 250 OP 636: Performed by: FAMILY MEDICINE

## 2020-03-21 PROCEDURE — 87086 URINE CULTURE/COLONY COUNT: CPT | Performed by: FAMILY MEDICINE

## 2020-03-21 PROCEDURE — 96374 THER/PROPH/DIAG INJ IV PUSH: CPT

## 2020-03-21 RX ORDER — MULTIVITAMIN,THERAPEUTIC
1 TABLET ORAL ONCE
Status: COMPLETED | OUTPATIENT
Start: 2020-03-21 | End: 2020-03-21

## 2020-03-21 RX ORDER — FOLIC ACID 1 MG/1
1 TABLET ORAL ONCE
Status: COMPLETED | OUTPATIENT
Start: 2020-03-21 | End: 2020-03-21

## 2020-03-21 RX ORDER — LANOLIN ALCOHOL/MO/W.PET/CERES
100 CREAM (GRAM) TOPICAL ONCE
Status: COMPLETED | OUTPATIENT
Start: 2020-03-21 | End: 2020-03-21

## 2020-03-21 RX ORDER — ONDANSETRON 4 MG/1
4 TABLET, ORALLY DISINTEGRATING ORAL EVERY 8 HOURS PRN
Qty: 10 TABLET | Refills: 0 | Status: SHIPPED | OUTPATIENT
Start: 2020-03-21 | End: 2020-08-15

## 2020-03-21 RX ORDER — KETOROLAC TROMETHAMINE 15 MG/ML
15 INJECTION, SOLUTION INTRAMUSCULAR; INTRAVENOUS ONCE
Status: COMPLETED | OUTPATIENT
Start: 2020-03-21 | End: 2020-03-21

## 2020-03-21 RX ORDER — LORAZEPAM 2 MG/ML
1 INJECTION INTRAMUSCULAR
Status: DISCONTINUED | OUTPATIENT
Start: 2020-03-21 | End: 2020-03-21 | Stop reason: HOSPADM

## 2020-03-21 RX ORDER — CEFTRIAXONE SODIUM 2 G/50ML
2 INJECTION, SOLUTION INTRAVENOUS ONCE
Status: COMPLETED | OUTPATIENT
Start: 2020-03-21 | End: 2020-03-21

## 2020-03-21 RX ORDER — MAGNESIUM CARB/ALUMINUM HYDROX 105-160MG
296 TABLET,CHEWABLE ORAL ONCE
Qty: 296 ML | Refills: 0 | Status: SHIPPED | OUTPATIENT
Start: 2020-03-21 | End: 2020-04-02

## 2020-03-21 RX ORDER — SODIUM CHLORIDE 9 MG/ML
1000 INJECTION, SOLUTION INTRAVENOUS CONTINUOUS
Status: DISCONTINUED | OUTPATIENT
Start: 2020-03-21 | End: 2020-03-21 | Stop reason: HOSPADM

## 2020-03-21 RX ORDER — MAGNESIUM OXIDE 400 MG/1
800 TABLET ORAL ONCE
Status: COMPLETED | OUTPATIENT
Start: 2020-03-21 | End: 2020-03-21

## 2020-03-21 RX ORDER — POLYETHYLENE GLYCOL 3350 17 G/17G
1 POWDER, FOR SOLUTION ORAL DAILY
Qty: 510 G | Refills: 0 | Status: SHIPPED | OUTPATIENT
Start: 2020-03-21 | End: 2020-04-02

## 2020-03-21 RX ORDER — PHENAZOPYRIDINE HYDROCHLORIDE 200 MG/1
200 TABLET, FILM COATED ORAL 3 TIMES DAILY PRN
Qty: 9 TABLET | Refills: 0 | Status: SHIPPED | OUTPATIENT
Start: 2020-03-21 | End: 2020-04-02

## 2020-03-21 RX ADMIN — LORAZEPAM 1 MG: 2 INJECTION INTRAMUSCULAR; INTRAVENOUS at 16:20

## 2020-03-21 RX ADMIN — THIAMINE HCL TAB 100 MG 100 MG: 100 TAB at 15:30

## 2020-03-21 RX ADMIN — LORAZEPAM 1 MG: 2 INJECTION INTRAMUSCULAR; INTRAVENOUS at 15:26

## 2020-03-21 RX ADMIN — SODIUM CHLORIDE 1000 ML: 9 INJECTION, SOLUTION INTRAVENOUS at 15:29

## 2020-03-21 RX ADMIN — MULTIVITAMIN TABLET 1 TABLET: TABLET at 15:30

## 2020-03-21 RX ADMIN — SODIUM CHLORIDE 1000 ML: 9 INJECTION, SOLUTION INTRAVENOUS at 16:13

## 2020-03-21 RX ADMIN — FOLIC ACID 1 MG: 1 TABLET ORAL at 15:30

## 2020-03-21 RX ADMIN — CEFTRIAXONE SODIUM 2 G: 2 INJECTION, SOLUTION INTRAVENOUS at 16:14

## 2020-03-21 RX ADMIN — Medication 800 MG: at 15:30

## 2020-03-21 RX ADMIN — KETOROLAC TROMETHAMINE 15 MG: 15 INJECTION, SOLUTION INTRAMUSCULAR; INTRAVENOUS at 17:12

## 2020-03-21 ASSESSMENT — ENCOUNTER SYMPTOMS
BLOOD IN STOOL: 0
FEVER: 0
HEADACHES: 0
DYSURIA: 1
VOMITING: 0
CONSTIPATION: 0
CHILLS: 0
FREQUENCY: 1
SINUS PRESSURE: 0
DIARRHEA: 0
PALPITATIONS: 0
WHEEZING: 0
SHORTNESS OF BREATH: 0
DIAPHORESIS: 0
COUGH: 0
ABDOMINAL PAIN: 0
SORE THROAT: 0
NAUSEA: 1
HEMATURIA: 1

## 2020-03-21 ASSESSMENT — MIFFLIN-ST. JEOR: SCORE: 1640.85

## 2020-03-21 NOTE — DISCHARGE INSTRUCTIONS
ICD-10-CM    1. Acute cystitis with hematuria  N30.01     wbc increased here today and increased heart rfate.  rocephin given and urine sent for culture.  due to antibiotic allergy to sulfa, and we discussed risk of cipro will use augmentin for 7 days (this length covers for both bladder and kidney infection).  take pyridium for bladder pain   2. Alcohol consumption binge drinking  F10.10     consider detox and treatment.   continue home thiamine 100 mg daily   3. Constipation, unspecified constipation type  K59.00     use fleets enema x1, followed by mag citrate 1 bottle, then miralax 1 capful daily in 8 oz for one week, then fiber supplement   use probiotics or live culture yogurt

## 2020-03-21 NOTE — ED AVS SNAPSHOT
[facility] Emergency Department  [address] [address]  Phone:  [phone]  Fax:  [phone]                                    Ana Murphy   MRN: [id number]    Department:  [facility] Emergency Department   Date of Visit:  3/21/2020           After Visit Summary Signature Page    I have received my discharge instructions, and my questions have been answered. I have discussed any challenges I see with this plan with the nurse or doctor.    ..........................................................................................................................................  Patient/Patient Representative Signature      ..........................................................................................................................................  Patient Representative Print Name and Relationship to Patient    ..................................................               ................................................  Date                                   Time    ..........................................................................................................................................  Reviewed by Signature/Title    ...................................................              ..............................................  Date                                               Time          22EPIC Rev 08/18

## 2020-03-21 NOTE — ED NOTES
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 Report she usually drinks 1/2 liter of vodka a day . Took several shots this of vodka this morning ~ 0500 to try to help with her withdrawal sx. Took 4 Zofran tablets this morning for N/V. States she had \"black out periods\" last time she was here, was told they could be mild seizures. Seizure pads applied to bed. Tremulous and anxious. Reports mild visual hallucinations (floaters and flashes of light). Denies auditory hallucinations. Reports constipation. Urinary sx included burning urination, dribbling blood and frequency. Has been taking AZO gummies for the last few days. Has not been seen for UTI sx in clinic due to closures.    "

## 2020-03-22 LAB
BACTERIA SPEC CULT: ABNORMAL
BACTERIA SPEC CULT: ABNORMAL
Lab: ABNORMAL
SPECIMEN SOURCE: ABNORMAL

## 2020-03-23 ENCOUNTER — TELEPHONE (OUTPATIENT)
Dept: EMERGENCY MEDICINE | Facility: CLINIC | Age: 25
End: 2020-03-23

## 2020-03-23 DIAGNOSIS — N39.0 URINARY TRACT INFECTION: ICD-10-CM

## 2020-03-23 RX ORDER — CEPHALEXIN 500 MG/1
500 CAPSULE ORAL 4 TIMES DAILY
Qty: 40 CAPSULE | Refills: 0 | Status: SHIPPED | OUTPATIENT
Start: 2020-03-23 | End: 2020-08-15

## 2020-03-23 NOTE — TELEPHONE ENCOUNTER
[facility] Emergency Department Lab result notification [Adult-Female]    [facility] lab result protocol used  Urine culture    Reason for call  Notify of lab results, assess symptoms,  review ED providers recommendations/discharge instructions (if necessary) and advise per ED lab result f/u protocol    Lab Result (including Rx patient on, if applicable)  Final Urine Culture Report on 3/22/2020  Emergency Dept/Urgent Care discharge antibiotic prescribed: Amoxicillin-Clavulanate (Augmentin) 875-125 mg PO tablet, 1 tablet by mouth 2 times daily for 7 days   #1. Bacteria, 10,000 to 50,000 colonies/mL Escherichia coli, is [RESISTANT] to antibiotic.   Change in treatment as per [facility] ED Lab result protocol.  Information table from ED Provider visit on 3/21/2020  Symptoms reported at ED visit (Chief complaint, HPI)   Alcohol Intoxication       drank 1-2 shots of vodka this morning, usually drinks about 1/2 liter. hx of seizures last time she was here for withdrawl     UTI     HPI  Ana Murphy is a 24 year old female who presents with polysubstance abuse history and Adderall abuse.   She drank 1-2 shots of vodka this morning usually drinks about 1/2 L of vodka.  History of prior seizures with her withdrawal.  She felt as if she may be withdrawing this morning feeling a sense of malaise, but no obvious significant tremors.      She has been having suprapubic pain, hematuria and dysuria as well as midline low back pain for the last week.  She was seen March 2012 when she had negative Chlamydia gonorrhea testing as well as a urinalysis that was clear other than mild ketones.  I saw the patient on March 7 for elevated LFTs and alcohol intoxication.  She has psychology set up later this month.  She she is not interested in quitting her alcohol use she tends to binge drink for 4 days in a row and then will have a break and then return to drinking.  She denies other drug use.    She denies any current  other abdominal pain other than suprapubic pain.  There is no flank pain.  No history of pyelonephritis.  No vaginal discharge bleeding.  No STD history.    Was feeling nauseous this morning and took her Zofran that he had she had available.  Last menstrual period was several days ago and she is not using contraception.  Pregnancy test was negative on March 12     Significant Medical hx, if applicable (i.e. CKD, diabetes) none   Allergies Allergies   Allergen Reactions     Sulfa Drugs Hives      Weight, if applicable Wt Readings from Last 2 Encounters:   03/21/20 81.6 kg (180 lb)   03/07/20 90.7 kg (200 lb)      Coumadin/Warfarin [Yes /No] No   Creatinine Level (mg/dl) Creatinine   Date Value Ref Range Status   03/21/2020 0.71 0.52 - 1.04 mg/dL Final      Creatinine clearance (ml/min), if applicable Serum creatinine: 0.71 mg/dL 03/21/20 1454  Estimated creatinine clearance: 142.1 mL/min   Pregnant (Yes/No/NA) no   Breastfeeding (Yes/No/NA) no   ED providers Impression and Plan (applicable information) MDM: Ana Murphy is a 24 year old female who presents with a history of alcohol abuse -with binge drinking and elevated liver function tests, along with today experiencing lower abdominal cramping dysuria hematuria urinary frequency and a urinalysis on evaluation a week ago in clinic that was negative UA than ketones.  At the last visit that I saw her for in early March we discussed the elevated LFTs which continue to be increased in her binge drinking disorder and the need for cessation and treatment.  She subsequently saw Dr. Goldstein her primary provider in clinic who readdressed this with her and she is pending follow-up with mental health.  Distal testing in the clinic included STD testing that was negative for GC chlamydia.   Today she was offered detox but she refuses and will return home.  She is keeping alcohol cessation in mind.  While here she did receive IV fluids, rally pack including thiamine.   LFTs continue to be elevated likely due to her alcohol abuse but the ALT to AST ratio is reversed.  Due to this the last time she was here I did order hepatitis screening and she is negative for hepatitis A hepatitis B hepatitis C mononucleosis.  Tox screen today continues to be negative.  This will continue to need to be followed and she does need to quit alcohol.     Her urinalysis is positive today large urine white cells and leukocyte esterase in the urine is sent for culture.  Due to an elevated white count she is given 1 dose of IV Rocephin.  There is no arturo flank pain but this could represent pyelonephritis as she does have nausea.  Could be related to her alcohol use.  We discussed oral antibiotics.  She has a sulfa allergy and is previously been on Macrobid with no effect several months ago.  I offered coverage that would have enough renal penetration to cover pyelonephritis between oral ciprofloxacin and oral Augmentin.  She chose the Augmentin once hearing about potential adverse effects on ciprofloxacin.  Will treat for 7 days to cover for pyelonephritis.  Zofran was also prescribed.  She does also mention no stool in the last week without significant other pain and no signs of obstruction.  We discussed bowel regimen as below with precautions for return.   ED diagnosis Acute cystitis with hematuria - wbc increased here today and increased heart rfate.  rocephin given and urine sent for culture.  due to antibiotic allergy to sulfa, and we discussed risk of cipro will use augmentin for 7 days (this length covers for both bladder and kidney infection).  take pyridium for bladder pain   Alcohol consumption binge drinking - consider detox and treatment.   continue home thiamine 100 mg daily   Constipation, unspecified constipation type - use fleets enema x1, followed by mag citrate 1 bottle, then miralax 1 capful daily in 8 oz for one week, then fiber supplement      ED provider Alexander Oakley MD      RN  Assessment (Patient s current Symptoms), include time called.  [Insert Left message here if message left]  11:15AM: Spoke with patient. She states she is feeling better. Denies any urinary symptoms currently. States that she has an appointment with her PCP tomorrow.     RN Recommendations/Instructions per [place] ED lab result protocol  Patient notified of lab result and treatment recommendations.  Rx for Cephalexin (Keflex) 500 mg capsule, 1 capsule (500 mg) by mouth 4 times daily for 10 days sent to [Pharmacy - [facility] in [place]]. Medication verbally called to the [facility] pharmacist Aries.  Cephalexin was chosen as the treatment as the patient and the ED provider had discussed using Cipro and the patient declined starting that medication.   Advised to stop Augmentin and start Cephalexin.     RN reviewed information about UTI's.   Patient Education on preventing future UTI's.  1. Practice good personal hygiene. Wipe yourself from front to back after using the toilet. This helps keep bacteria from getting into the urethra. Keep the genital area clean and dry.  2. Drink plenty of fluids, such as water, juice, or other caffeine-free drinks.  .  3. Empty your bladder. Always empty your bladder when you feel the urge to urinate. And always urinate before going to sleep. Urine that stays in your bladder can lead to infection. Try to urinate before and after sex as well..  Follow up with your health care provider as directed. He or she may test to make sure the infection has cleared. If necessary, additional treatment may be started.    Advised to keep her appointment with her PCP for tomorrow.   The patient is comfortable with the information given and has no further questions.      Please Contact your PCP clinic or return to the Emergency department if your:    Symptoms worsen or other concerning symptom's.    PCP follow-up Questions asked: YES       [RN Name]  Teresa Vallejo RN  [place]  RN  Lung Nodule and ED Lab Result RN  Epic pool (ED late result f/u RN): [id number]  FV INCIDENTAL RADIOLOGY F/U NURSES: P [id number]  # [phone]    Copy of Lab result   Urine Culture Aerobic Bacterial [[id number]] (Order [id number])   Order Requisition     Urine Culture Aerobic Bacterial (Order #[id number]) on 3/21/20    Exam Information     Exam Date  Exam Time  Accession #  Results     3/21/20   2:57 PM  [id number]     Component Results     Specimen Information:  Midstream Urine          Component  Collected  Lab    Specimen Description  03/21/2020  2:57 PM  225    Midstream Urine    Special Requests  03/21/2020  2:57 PM  225    Specimen received in preservative    Culture Micro Abnormal    03/21/2020  2:57 PM  225    10,000 to 50,000 colonies/mL   Escherichia coli    Culture Micro  03/21/2020  2:57 PM  225    10,000 to 50,000 colonies/mL   mixed urogenital angie    Susceptibility     Escherichia coli     Antibiotic  Interpretation  Sensitivity  Method  Status    AMPICILLIN  Resistant  >=32  ug/mL  PAUL  Final    AMPICILLIN/SULBACTAM  Resistant  >=32  ug/mL  PAUL  Final    CEFAZOLIN  Sensitive  <=4  ug/mL  PAUL  Final     Cefazolin PAUL breakpoints are for the treatment of uncomplicated urinary tract    infections.  For the treatment of systemic infections, please contact the   laboratory for additional testing.    CEFEPIME  Sensitive  <=1  ug/mL  PAUL  Final    CEFOXITIN  Sensitive  <=4  ug/mL  PAUL  Final    CEFTAZIDIME  Sensitive  <=1  ug/mL  PAUL  Final    CEFTRIAXONE  Sensitive  <=1  ug/mL  PAUL  Final    CIPROFLOXACIN  Sensitive  <=0.25  ug/mL  PAUL  Final    GENTAMICIN  Sensitive  <=1  ug/mL  PAUL  Final    LEVOFLOXACIN  Intermediate  1  ug/mL  PAUL  Final    NITROFURANTOIN  Sensitive  <=16  ug/mL  PAUL  Final    Piperacillin/Tazo  Sensitive  <=4  ug/mL  PAUL  Final    TOBRAMYCIN  Sensitive  <=1  ug/mL  PAUL  Final    Trimethoprim/Sulfa  Resistant  >=16/304  ug/mL  PAUL  Final

## 2020-03-26 ENCOUNTER — HOSPITAL ENCOUNTER (OUTPATIENT)
Dept: BEHAVIORAL HEALTH | Facility: CLINIC | Age: 25
Discharge: HOME | End: 2020-03-26
Attending: SOCIAL WORKER | Admitting: SOCIAL WORKER
Payer: COMMERCIAL

## 2020-03-26 VITALS — BODY MASS INDEX: 28.14 KG/M2 | HEIGHT: 69 IN | WEIGHT: 190 LBS

## 2020-03-26 PROCEDURE — H0001 ALCOHOL AND/OR DRUG ASSESS: HCPCS | Mod: 95 | Performed by: COUNSELOR

## 2020-03-26 RX ORDER — SPIRONOLACTONE 50 MG/1
50 TABLET, FILM COATED ORAL DAILY
COMMUNITY
Start: 2020-02-26

## 2020-03-26 ASSESSMENT — ANXIETY QUESTIONNAIRES
5. BEING SO RESTLESS THAT IT IS HARD TO SIT STILL: NOT AT ALL
IF YOU CHECKED OFF ANY PROBLEMS ON THIS QUESTIONNAIRE, HOW DIFFICULT HAVE THESE PROBLEMS MADE IT FOR YOU TO DO YOUR WORK, TAKE CARE OF THINGS AT HOME, OR GET ALONG WITH OTHER PEOPLE: SOMEWHAT DIFFICULT
GAD7 TOTAL SCORE: 5
3. WORRYING TOO MUCH ABOUT DIFFERENT THINGS: MORE THAN HALF THE DAYS
2. NOT BEING ABLE TO STOP OR CONTROL WORRYING: SEVERAL DAYS
7. FEELING AFRAID AS IF SOMETHING AWFUL MIGHT HAPPEN: NOT AT ALL
6. BECOMING EASILY ANNOYED OR IRRITABLE: NOT AT ALL
1. FEELING NERVOUS, ANXIOUS, OR ON EDGE: MORE THAN HALF THE DAYS

## 2020-03-26 ASSESSMENT — PATIENT HEALTH QUESTIONNAIRE - PHQ9
SUM OF ALL RESPONSES TO PHQ QUESTIONS 1-9: 0
5. POOR APPETITE OR OVEREATING: NOT AT ALL

## 2020-03-26 ASSESSMENT — PAIN SCALES - GENERAL: PAINLEVEL: MILD PAIN (3)

## 2020-03-26 ASSESSMENT — MIFFLIN-ST. JEOR: SCORE: 1686.21

## 2020-03-26 NOTE — PROGRESS NOTES
[address]  [place] [address]        ADULT CD ASSESSMENT ADDENDUM      Patient Name: Ana Murphy  Cell Phone:   Home: [phone] (home)    Mobile:   Telephone Information:   Mobile [phone]       Email:  [email or web address]  Emergency Contact: None   Tel: Declined to provide    The patient reported being:  Single, in a serious relationship    With which race do you identify? White    Initial Screening Questions     1. Are you currently having severe withdrawal symptoms that are putting yourself or others in danger?  No    2. Are you currently having severe medical problems that require immediate attention?  No    3. Are you currently having severe emotional or behavioral problems that are putting yourself or others at risk of harm?  No    4. Do you have sufficient reading skills that will enable you to understand written materials, including the program rules and client rights materials?  Yes     Family History and other additional information     Who raised you? (parents, grandparents, adoptive parents, step-parents, etc.)    Father  Grandmother    Please tell me what it was like growing up in your family. (please include any history of substance abuse, mental health issues, emotional/physical/sexual abuse, forms of discipline, and support)     Per patient: father living, no siblings mother passed at age 38. My mom  when I was 12 and my dad, grandmother would take care of me because dad would be working and stuff. No abuse within the home.  Grew up in [place].     Do you have any children or Stepchildren? No    Are you being investigated by Child Protection Services? No    Do you have a child protection worker, probation office or ?  No    How would you describe your current finances?  Just making it    If you are having problems, (unpaid bills, bankruptcy, IRS problems) please explain:  No    If  working or a student are you able to function appropriately in that setting? No, explain: due to use.     Describe your preferred learning style:  by watching someone else demonstrate    What are your some of your personal strengths?  fast learner    Do you currently participate in community serge activities, such as attending Worship, temple, [employer] or Buddhist services?  No    How does your spirituality impact your recovery?  Pt reported she is not spiritual.     Do you currently self-administer your medications?  Yes    Have you ever had to lie to people important to you about how much you parikh?   No   Have you ever felt the need to bet more and more money?   No   Have you ever attempted treatment for a gambling problem?   No   Have you ever touched or fondled someone else inappropriately or forced them to have sex with you against their will?   No   Are you or have you ever been a registered sex offender?   No   Is there any history of sexual abuse in your family? No   Have you ever felt obsessed by your sexual behavior, such as having sex with many partners, masturbating often, using pornography often?   No     Have you ever received therapy or stayed in the hospital for mental health problems?   Yes, explain: pt reported past therapy     Have you ever hurt yourself, such as cutting, burning or hitting yourself?   No     Have you ever purged, binged or restricted yourself as a way to control your weight?   No     Are you on a special diet?   Yes, explain: kind of on Keto, Dirty Keto, low carb     Do you have any concerns regarding your nutritional status?   No     Have you had any appetite changes in the last 3 months?   Yes, explain: decreased due to diet.    Have you had weight loss or weight gain of more than 10 lbs in the last 3 months?   If patient gained or lost more than 10 lbs, then refer to program RN / attending Physician for assessment.   Yes, explain: 20lb in the last 3 months   Was the patient  informed of BMI?    Above,  General nutrition education   No   Have you engaged in any risk-taking behavior that would put you at risk for exposure to blood-borne or sexually transmitted diseases?   No   Do you have any dental problems?   No   Have you ever lived through any trauma or stressful life events?   Yes, explain: pt reported mother passing away when 12.    In the past month, have you had any of the following symptoms related to the trauma listed above? (dreams, intense memories, flashbacks, physical reactions, etc.)   No   Have you ever believed people were spying on you, or that someone was plotting against you or trying to hurt you?   No   Have you ever believed someone was reading your mind or could hear your thoughts or that you could actually read someone's mind or hear what another person was thinking?   No   Have you ever believed that someone of some force outside of yourself was putting thoughts into your mind or made you act in a way that was not your usual self?  Have you ever though you were possessed?   No   Have you ever believed you were being sent special messages through the TV, radio or newspaper?   No   Have you ever heard things other people couldn't hear, such as voices or other noises?   No   Have you ever had visions when you were awake?  Or have you ever seen things other people couldn't see?   No   Do you have a valid 's license?    Yes     PHQ-9, TWILA-7 and Suicide Risk Assessment   PHQ-9 on 3/26/2020 TWILA-7 on 3/26/2020   The patient's PHQ-9 score was 0 out of 27, indicating no depression.   The patient's TWILA-7 score was 5 out of 21, indicating mild anxiety.       Leroy-Suicide Severity Rating Scale   Suicide Ideation   1.) Have you ever wished you were dead or that you could go to sleep and not wake up?     Lifetime:  Yes   Past Month:  No     2.) Have you actually had any thoughts of killing yourself?   Lifetime:  Yes   Past Month:  No     3.) Have you been thinking  about how you might do this?     Lifetime:  Yes, Describe: pt reported at age 15 she tried to overdose on pills.Denied any SI or attempts since.   Past Month:  No     4.) Have you had these thoughts and had some intention of acting on them?     Lifetime:  Yes, Describe: see above   Past Month:  No     5.) Have you started to work out the details of how to kill yourself?   Lifetime:  Yes, Describe: see above   Past Month:  No     6.) Do you intend to carry out this plan?      Lifetime:  Yes, Describe: see above   Past Month:  No     Intensity of Ideation   Intensity of ideation (1 being least severe, 5 being most severe):     Lifetime:  5   Past Month:  The patient denied having any suicidal thoughts within the past month.     How often do you have these thoughts?  The patient denied having any suicidal thoughts within the past month.     When you have the thoughts how long do they last?  The patient denied having any suicidal thoughts within the past month.     Can you stop thinking about killing yourself or wanting to die if you want to?  The patient denied having any suicidal thoughts within the past month.     Are there things - anyone or anything (i.e. family, Orthodox, pain of death) that stopped you from wanting to die or acting on thoughts of suicide?  Does not apply     What sort of reasons did you have for thinking about wanting to die or killing yourself (ie end pain, stop how you were feeling, get attention or reaction, revenge)?  Does not apply     Suicidal Behavior   (Suicide Attempt) - Have you made a suicide attempt?     Lifetime:  Yes.  Total number of attempts:  1.  Date of most recent attempt:  See above.   Past Month:  The patient had not made a suicide attempt within the past month.     Have you engaged in self-harm (non-suicidal self-injury)?  The patient denied having any history of engaging in self-harm (non-suicidal self-injury).     (Interrupted Attempt) - Has there been a time when you  started to do something to end your life but someone or something stopped you before you actually did anything?  No     (Aborted or Self-Interrupted Attempt) - Has there been a time when you started to do something to try to end your life but you stopped yourself before you actually did anything?  No     (Preparatory Acts of Behavior) - Have you taken any steps towards making suicide attempt or preparing to kill yourself (such as collecting pills, getting a gun, giving valuables away or writing a suicide note)?  No     Actual Lethality/Medical Damage:  The patient denied ever making a suicidal attempt.       2008  [employer] [facility]  Used with permission by Marta Loving, PhD.       Guide to C-SSRS Risk Ratings   NO IDEATION:  with no active thoughts IDEATION: with a wish to die. IDEATION: with active thoughts. Risk Ratings   If Yes No No 0 - Very Low Risk   If NA Yes No 1 - Low Risk   If NA Yes Yes 2 - Low/moderate risk   IDEATION: associated thoughts of methods without intent or plan INTENT: Intent to follow through on suicide PLAN: Plan to follow through on suicide Risk Ratings cont...   If Yes No No 3 - Moderate Risk   If Yes Yes No 4 - High Risk   If Yes Yes Yes 5 - High Risk   The patient's ADDITIONAL RISK FACTORS and lack of PROTECTIVE FACTORS may increase their overall suicide risk ratings.     Additional Risk Factors:    Significant history of physical illness or chronic medical problems     Significant history of untreated or poorly treated chronic pain issues     Tendency to be socially isolated and/or cut off from the support of others     Significant history of trauma and/or abuse issues   Protective Factors:    Having people in his/her life that would prevent the patient from considering a suicide attempt (i.e. young children, spouse, parents, etc.)     A positive relationship with his/her clinical medical and/or mental health providers     Risk Status   Past month: 0. -  "Very Low Risk:  Evaluation Counselors:  Document in Epic / SBAR to counselor \"Very Low Risk\".      Treatment Counselors:  Reassess upon admission as applicable, assess weekly in progress notes under Dimension 3 and summarize in Discharge / Treatment summary under Dimension 3.    Past 24 hours: 0. - Very Low Risk:  Evaluation Counselors:  Document in Epic / SBAR to counselor \"Very Low Risk\".      Treatment Counselors:  Reassess upon admission as applicable, assess weekly in progress notes under Dimension 3 and summarize in Discharge / Treatment summary under Dimension 3.   Additional information to support suicide risk rating: There was no additional information to provide at this time.     Mental Health Status   Physical Appearance/Attire: Comment: telephone eval due to covid-19   Hygiene: Comment: telephone eval due to covid-19   Eye Contact: comment: telephone eval due to covid-19   Speech Rate:  regular   Speech Volume: regular   Speech Quality: fluid   Cognitive/Perceptual:  reality based   Cognition: memory intact    Judgment: able to concentrate   Insight: able to concentrate   Orientation:  time, place, person and situation   Thought: concrete   Hallucinations:  none   General Behavioral Tone: cooperative   Psychomotor Activity: telephone eval due to covid-19   Gait:  telephone eval due to covid-19   Mood: appropriate   Affect: congruence/appropriate   Counselor Notes: NA     Criteria for Diagnosis: DSM-5 Criteria for Substance Use Disorders      Alcohol Use Disorder Severe - 303.90 (F10.20)  Sedative, Hypnotic, 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)    Level of Care   I.) Intoxication and Withdrawal: 1   II.) Biomedical:  1   III.) Emotional and Behavioral:  2   IV.) Readiness to Change:  2   V.) Relapse Potential: 4   VI.) Recovery Environmental: 4     Initial Problem List     The patient is currently living in an unhealthy and/or using environment  The patient lacks relapse prevention " skills  The patient has poor coping skills  The patient has poor refusal skills   The patient lacks a sober peer support network  The patient has a tendency to isolate  The patient lacks the ability to effectively manage his/her mental health issues  The patient has a significant history of trauma and/or abuse issues    Patient/Client is willing to follow treatment recommendations.  Yes    Counselor: FINESSE Hodges

## 2020-03-26 NOTE — PROGRESS NOTES
"The patient has been notified of the following:     \"We have found that certain health care needs can be provided without the need for a face to face visit.  This service lets us provide the care you need with a phone conversation.      I will have full access to your [facility] medical record during this entire phone call.   I will be taking notes for your medical record.     Since this is like an office visit, we will bill your insurance company for this service.  If your insurance denies the charge we will appeal and/or write off the cost of the service.  The Governor's executive order may result in expanded health insurance coverage for this service, which would be paid by your insurance.         There are potential benefits and risks of telephone visits (e.g. limits to patient confidentiality) that differ from in-person visits.?  Confidentiality still applies for telephone services, and nobody will record the visit.  It is important to be in a quiet, private space that is free of distractions (including cell phone or other devices) during the visit.??     If during the course of the call I believe a telephone visit is not appropriate, you will not be charged for this service\"    Consent has been obtained for this service by care team member: Yes  Start time:1:01pm  End time: 2:00pm    Rule 25 Assessment  Background Information   1. Date of Assessment Request  2. Date of Assessment  3/26/2020 3. Date Service Authorized     4.   FINESSE Hodges   5.  Phone Number   [phone] 6. Referent  Self 7. Assessment Site  [facility]     8. Client Name   Ana Murphy 9. Date of Birth  1995 Age  24 year old 10. Gender  female  11. PMI/ Insurance No.     12. Client's Primary Language:  English 13. Do you require special accommodations, such as an  or assistance with written material? No   14. Current Address: [address] [place] [address] " [id number]   15. Client Phone Numbers: [phone] (home)      16. Tell me what has happened to bring you here today.    Per patient: because drink too much.     17. Have you had other rule 25 assessments?     Yes. When, Where, and What circumstances: I was a kid, 18, I think the last one in [place] an OP I was going to after being in IP treatment. Went to IP at [facility] in [place].     DIMENSION I - Acute Intoxication /Withdrawal Potential   1. Chemical use most recent 12 months outside a facility and other significant use history (client self-report)              X = Primary Drug Used   Age of First Use Most Recent Pattern of Use and Duration   Need enough information to show pattern (both frequency and amounts) and to show tolerance for each chemical that has a diagnosis   Date of last use and time, if needed   Withdrawal Potential? Requiring special care Method of use  (oral, smoked, snort, IV, etc)   X   Alcohol     16 Per patient:every day, consume in a day- half liter. Pattern been going on for-stopped a couple times, but does not last long when I stop. Last about a day or 2 after go to hospital in w/d. Three months. Prior 3 months ago-maybe like would get drunk one day out of the week. Not even that.     Per EHR ED admission note on 3/21/20:  Chief Complaint   Patient presents with     Alcohol Intoxication       drank 1-2 shots of vodka this morning, usually drinks about 1/2 liter. hx of seizures last time she was here for withdrawl        She drank 1-2 shots of vodka this morning usually drinks about 1/2 L of vodka.  History of prior seizures with her withdrawal.       3/26/20, 2 hots, 12:45pm yes oral      Marijuana/  Hashish   16 Per patient:think smoked once in last year.  2 months ago no smoke      Cocaine/Crack     24 Per patient:little bit of cocaine. Three times in the last year. 3 months ago.  no IV      Meth/  Amphetamines   No use   Per EHR hx of adderall abuse. Pt reported she got off  prescribed adderall due to not liking the effects of it.          Heroin     No use          Other Opiates/  Synthetics   No use          Inhalants     No use          Benzodiazepines     16 Per patient:binge, half everyday for about a week stopped for a while then go back. When I would be hung over. That happens at least every three months, binge on it for a week and use 1mg per day of a 2mg pill.  Two weeks ago, half of a milligram, so 1mg, morning no oral      Hallucinogens     No use          Barbiturates/  Sedatives/  Hypnotics No use          Over-the-Counter Drugs   No use          Other     No use          Nicotine     15 Per patient: half pack per day. 3/25/20, 10, evening no smoke     2. Do you use greater amounts of alcohol/other drugs to feel intoxicated or achieve the desired effect?  Yes.  Or use the same amount and get less of an effect?  Yes.  Example: The patient reported having increased use and tolerance issues with alcohol.    3A. Have you ever been to detox?     No    3B. When was the first time?     The patient denied ever having a detoxification admission.    3C. How many times since then?     The patient denied ever having a detoxification admission.    3D. Date of most recent detox:     The patient denied ever having a detoxification admission.    4.  Withdrawal symptoms: Have you had any of the following withdrawal symptoms?  Past 12 months Recent (past 30 days)   Sweating (Rapid Pulse)  Shaky / Jittery / Tremors  Unable to Sleep  Agitation  Fatigue / Extremely Tired  Sad / Depressed Feeling  Muscle Aches  Vivid / Unpleasant Dreams  Irritability  High Blood Pressure  Nausea / Vomiting  Dizziness  Seizures  Diarrhea  Diminished Appetite  Hallucinations  Anxiety / Worried Sweating (Rapid Pulse)  Shaky / Jittery / Tremors  Unable to Sleep  Agitation  Fatigue / Extremely Tired  Sad / Depressed Feeling  Muscle Aches  Vivid / Unpleasant Dreams  Irritability  High Blood Pressure  Nausea /  Vomiting  Dizziness  Seizures  Diarrhea  Diminished Appetite  Hallucinations  Anxiety / Worried     's Visual Observations and Symptoms: telephone assessment due to covid-19.     Based on the above information, is withdrawal likely to require attention as part of treatment participation?  Yes    Dimension I Ratings   Acute intoxication/Withdrawal potential - The placing authority must use the criteria in Dimension I to determine a client s acute intoxication and withdrawal potential.    RISK DESCRIPTIONS - Severity ratin Client can tolerate and cope with withdrawal discomfort. The client displays mild to moderate intoxication or signs and symptoms interfering with daily functioning but does not immediately endanger self or others. Client poses minimal risk of severe withdrawal.    REASONS SEVERITY WAS ASSIGNED (What about the amount of the person s use and date of most recent use and history of withdrawal problems suggests the potential of withdrawal symptoms requiring professional assistance? )     Pt reported last use date at 3/26/20. Pt reported she has been drinking daily. Pt reported she is aware of ED resources and has been through the ED as recently as 3/21/20 which she was offered transfer to detox if bed availability which pt reported she declined. Pt reported she is aware of detox resources. The writer provided detox information to the patient.        DIMENSION II - Biomedical Complications and Conditions   1a. Do you have any current health/medical conditions?(Include any infectious diseases, allergies, or chronic or acute pain, history of chronic conditions)       Yes.   Illnesses/Medical Conditions you are receiving care for:   Per EHR [facility] hx:  Patient Active Problem List   Diagnosis Date Noted     Fatty liver 2020   3/2020 CT abdomen: IMPRESSION:   1.  No acute abdominal or pelvic process.  2.  Moderate hepatic steatosis.      ASCUS with positive high risk HPV cervical  11/07/2018 11/2018 ASCUS/HPV+,   11/2019 ASCUS/HPV+    Plan: Pap due 11/2020      Breakthrough bleeding with IUD 09/21/2018     Healthcare maintenance 02/09/2018 11/2019: Zinc high. 3/2018: to try to wean off Baclofen. 2/2018: drug screen-alcohol/ benzo, reviewed with Patient, Chlamydia positive, treated, recheck 3/18 and 5/18 negative.  Mother passed away in her 30's- liver failure alcohol related. Father had valvular heart disease and colon cancer (in his 40's)  Father diagnosed with colon cancer at age 46.      IUD (intrauterine device) in place 01/30/2018   Paragard placed 1/30/2018, remove on or before 1/30/2028.      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 Stopped 4/2012      Dysmenorrhea 10/13/2011     Nondependent cannabis abuse 06/14/2011     Counseling for parent-child problem, unspecified 01/26/2011     Educational circumstance 01/26/2011     Bereavement, uncomplicated 12/08/2010   Death of Mother: 5.07      Adjustment disorder with depressed mood 11/15/2010     Allergies   Allergen Reactions     Sulfa (Sulfonamide Antibiotics) Hives     Family History   Problem Relation Age of Onset     Alcohol/Drug Mother   alcohol-related liver failure death @ 37yo     Psychiatric illness Mother   depression, prozac     Heart Disease Father   fa had SBE and subsequent AVR     Alcohol/Drug  Maternal Grandfather     Psychiatric illness Maternal Grandmother   depression, prozac     Psychiatric illness Other   depression     Current Outpatient Medications on File Prior to Visit   Medication Sig Dispense Refill     ascorbic acid-vitamin E-biotin (HAIR, SKIN, NAILS WITH BIOTIN) 7.5-7.5-1,250 mg-unit-mcg chew Take 1 Tab by mouth once daily.     fluticasone (50 mcg per actuation) nasal solution (FLONASE) Inhale 1 Spray in the nostril(s) once daily. 1 Bottle 0     lactobacillus rhamnosus  (PROBIOTIC DIGESTIVE CARE ORAL) Take 1 Cap by mouth once daily.     medication order composer Take 1 Tab by mouth once daily. metabolism supplement     meloxicam 15 mg tablet TAKE 1 TABLET BY MOUTH ONCE DAILY 90 tablet 0     spironolactone (ALDACTONE) 50 mg tablet Take 1 tablet by mouth once daily. 30 tablet 5     No current facility-administered medications on file prior to visit.     Past Medical History:   . Date     Adjustment disorder with depressed mood 11/15/2010   Dr. 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 Laterality Date     NO PREVIOUS SURGERY     SD ETONOGESTREL IMPLANT SYSTEM 2/5/14     Social History     Tobacco Use     Smoking status: Current Every Day Smoker   Packs/day: 0.25   Years: 4.00   Pack years: 1.00   Types: Cigarettes   Start date: 6/16/2012     Smokeless tobacco: Never Used     Tobacco comment: 1-3 cigarettes per day   Substance Use Topics     Alcohol use: Yes   Alcohol/week: 2.0 - 3.0 standard drinks   Types: 2 - 3 Cans of beer per week   Frequency: 4 or more times a week   Drinks per session: 1 or 2   Binge frequency: Weekly     Drug use: No   Comment:  abuses pills          per EHR [facility] hx:  Allergies:       Allergies   Allergen Reactions     Sulfa Drugs Hives        Problem List:          Patient Active Problem List     Diagnosis Date Noted     Polysubstance abuse (H) 05/07/2013       Priority: Medium     Alcohol abuse, episodic 03/01/2013       Priority: Medium     Amphetamine abuse in remission (H) 04/17/2012       Priority: Medium       Overview:   Early 2012 - had been using a friend's and buying it - using about 20mg a day.  Stopped 4/2012        Nondependent cannabis abuse 06/14/2011       Priority: Medium         Past Medical History:    No past medical history on file.     Past Surgical History:    Past Surgical History   No past surgical history on file.        Family History:    Family History   No family history on file.        Social History:  Marital Status:  Single [1]  Social History            Tobacco Use     Smoking status: Current Every Day Smoker       Packs/day: 0.50       Types: Cigarettes     Smokeless tobacco: Never Used   Substance Use Topics     Alcohol use: Yes       Alcohol/week: 21.0 standard drinks       Types: 21 Cans of beer per week     Drug use: Not Currently       Types: Marijuana         1b. On a scale of mild, moderate to severe please specify the severity of the patient's diabetes and/or neuropathy.    The patient denied having a history of being diagnosed with diabetes or neuropathy.    2. Do you have a health care provider? When was your most recent appointment? What concerns were identified?     The patient's PCP is Dr. Goldstein.  The patient's Primary Medical Clinic is [facility].  Per EHR last communicated with pcp due to covid-19 restrictions on 3/20/20.    3. If indicated by answers to items 1 or 2: How do you deal with these concerns? Is that working for you? If you are not receiving care for this problem, why not?      The patient reported taking prescription medications as prescribed for  the above medical issues.    4A. List current medication(s) including over-the-counter or herbal supplements--including pain management:     Per patient:  One for UTI-amoxicillin  Spironolactone-acne  Zofran sometimes    Per EHR through [facility]:    spironolactone (ALDACTONE) 50 mg tablet Take 1 tablet by mouth once daily. 30 tablet 5     Per EHR prescribed hx:  Current Outpatient Medications   Medication     amoxicillin-clavulanate (AUGMENTIN) 875-125 MG tablet     amphetamine-dextroamphetamine (ADDERALL) 30 MG per tablet     Biotin w/ Vitamins C & E (HAIR/SKIN/NAILS) 1250-7.5-7.5 MCG-MG-UNT CHEW     cephALEXin (KEFLEX) 500 MG capsule     COPPER PO     dicyclomine (BENTYL) 20 MG tablet     Multiple Vitamins-Minerals (MULTIVITAMIN GUMMIES WOMENS) CHEW     nitroFURantoin macrocrystal-monohydrate (MACROBID) 100 MG capsule     polyethylene glycol (MIRALAX) powder     Probiotic Product (HEALTHY COLON PO)     psyllium (METAMUCIL/KONSYL) capsule     Specialty Vitamins Products (GNP CENTURY ENERGY METABOLISM PO)     Wheat Dextrin (BENEFIBER) POWD     No current facility-administered medications for this encounter.        4B. Do you follow current medical recommendations/take medications as prescribed?     Yes    4C. When did you last take your medication?     Zofran-yesterday  Spiron-yesterday  antibiotic    4D. Do you need a referral to have a follow up with a primary care physician?    No.    5. Has a health care provider/healer ever recommended that you reduce or quit alcohol/drug use?     Yes    6. Are you pregnant?     No    7. Have you had any injuries, assaults/violence towards you, accidents, health related issues, overdose(s) or hospitalizations related to your use of alcohol or other drugs:     Yes, explain: per EHR ED admissions related to use on 3/21/20, 3/7/20, 2/2020, 1/18/20.    8. Do you have any specific physical needs/accommodations? No    Dimension II Ratings   Biomedical Conditions and Complications - The  placing authority must use the criteria in Dimension II to determine a client s biomedical conditions and complications.   RISK DESCRIPTIONS - Severity ratin Client tolerates and jaleesa with physical discomfort and is able to get the services that the client needs.    REASONS SEVERITY WAS ASSIGNED (What physical/medical problems does this person have that would inhibit his or her ability to participate in treatment? What issues does he or she have that require assistance to address?)    Pt reported medical conditions. Pt reported she has a primary care provider and is able to get the services she needs. Pt reported she takes her medications as prescribed and directed. Pt denied having any chronic or acute physical health conditions that would interfere with treatment.        DIMENSION III - Emotional, Behavioral, Cognitive Conditions and Complications   1. (Optional) Tell me what it was like growing up in your family. (substance use, mental health, discipline, abuse, support)     Per patient: father living, no siblings mother passed at age 36. My mom  when I was 12 and my dad, grandmother would take care of me because dad would be working and stuff. No abuse within the home.  Grew up in [place].     2. When was the last time that you had significant problems...  A. with feeling very trapped, lonely, sad, blue, depressed or hopeless  about the future? Past Month-not really nothing specific. Just depressed. Lost one job but working another one now.     B. with sleep trouble, such as bad dreams, sleeping restlessly, or falling  asleep during the day? Past Month-pt reported same reasons as above.     C. with feeling very anxious, nervous, tense, scared, panicked, or like  something bad was going to happen? Past Month-pt reported dx of anxiety.     D. with becoming very distressed and upset when something reminded  you of the past? Past Month    E. with thinking about ending your life or committing  suicide? 1+ years ago per patient:- at age 15 had an attempt-tried taking a bunch of pills. Do not really remember. Had to go to hospital for it. No coma or anything. Denied SI since age 15.     3. When was the last time that you did the following things two or more times?  A. Lied or conned to get things you wanted or to avoid having to do  something? Never    B. Had a hard time paying attention at school, work, or home? Past Month-pt reported related to depression and use    C. Had a hard time listening to instructions at school, work, or home? Past Month-pt reported same as above.     D. Were a bully or threatened other people? Never    E. Started physical fights with other people? Never    Note: These questions are from the Global Appraisal of Individual Needs--Short Screener. Any item marked  past month  or  2 to 12 months ago  will be scored with a severity rating of at least 2.     For each item that has occurred in the past month or past year ask follow up questions to determine how often the person has felt this way or has the behavior occurred? How recently? How has it affected their daily living? And, whether they were using or in withdrawal at the time?    See above    4A. If the person has answered item 2E with  in the past year  or  the past month , ask about frequency and history of suicide in the family or someone close and whether they were under the influence.     Pt denied current SI.     Any history of suicide in your family? Or someone close to you?     The patient denied any family member or someone close to the patient had ever completed suicide.    4B. If the person answered item 2E  in the past month  ask about  intent, plan, means and access and any other follow-up information  to determine imminent risk. Document any actions taken to intervene  on any identified imminent risk.      The patient denied having any suicide ideation within the past month.    5A. Have you ever been diagnosed  with a mental health problem?     Yes, explain:per patient: depression and anxiety and ADHD      5B. Are you receiving care for any mental health issues? If yes, what is the focus of that care or treatment?  Are you satisfied with the service? Most recent appointment?  How has it been helpful?     Yes, per patient:prescribed medications in the past, adderall, she discontinued that a couple months ago because I do not like it. Cannot name them but a lot. Took Prozac for like 7 years. Been about two years for anything for prescribed medications for mental health. Past therapy. Supposed to go to my first appointment the other day, scheduled it a couple months ago but due to covid 19 cannot see him. Have not seen anyone recently. Supposed to go through [facility].     Per EHR [facility] telephone note on 3/19/20:  Telephone Encounter - Capri Bravo - 03/23/2020 9:26 AM CDT  Left message notifying patient she can call to schedule a telephone visit with Chantelle Armenta if she would like. Capri Bravo LPN 3/23/2020 9:26 AM  Electronically signed by Capri Bravo at 03/23/2020 9:27 AM CDT    Back to top of Miscellaneous Notes  Telephone Encounter - Grazyna Rico - 03/19/2020 10:56 AM CDT  10:57 AM  Reason for call: PATIENT CALLED TO SEE IF APPOINTMENT WAS STILL SCHEDULED. PATIENT HAD GOTTEN TIME WRONG. SHOULD PATIENT R/S, FOR 2 MONTHS OUT?    6. Have you been prescribed medications for emotional/psychological problems?     The patient reported a history of being prescribed psychotropic medications, but denied being prescribed any psychotropic medications at this time.    7. Does your MH provider know about your use?     The patient does not currently have any mental health providers.    8A. Have you ever been verbally, emotionally, physically or sexually abused?      No     Follow up questions to learn current risk, continuing emotional impact.      The patient denied having any history of being verbally, emotionally,  physically or sexually abused.    8B. Have you received counseling for abuse?      No    9. Have you ever experienced or been part of a group that experienced community violence, historical trauma, rape or assault?     No    10A. Jack:    No    11. Do you have problems with any of the following things in your daily life?    Dizziness and Performing your job/school work      Note: If the person has any of the above problems, follow up with items 12, 13, and 14. If none of the issues in item 11 are a problem for the person, skip to item 15.    The patient would benefit from developing sober coping skills.    12. Have you been diagnosed with traumatic brain injury or Alzheimer s?  No    13. If the answer to #12 is no, ask the following questions:    Have you ever hit your head or been hit on the head? No    Were you ever seen in the Emergency Room, hospital or by a doctor because of an injury to your head? No    Have you had any significant illness that affected your brain (brain tumor, meningitis, West Nile Virus, stroke or seizure, heart attack, near drowning or near suffocation)? Yes, I do not know what it was but a month ago, when hospitalized w/d and had food poisoning and w/d shaking bad and could not remember anything. Could not see anything. Only time that has ever happened to me.     14. If the answer to #12 is yes, ask if any of the problems identified in #11 occurred since the head injury or loss of oxygen. The patient had never had a head injury or a loss of oxygen.    15A. Highest grade of school completed:     High school graduate/GED    15B. Do you have a learning disability? Yes    15C. Did you ever have tutoring in Math or English? No    15D. Have you ever been diagnosed with Fetal Alcohol Effects or Fetal Alcohol Syndrome? No    16. If yes to item 15 B, C, or D: How has this affected your use or been affected by your use?     No    Dimension III Ratings   Emotional/Behavioral/Cognitive - The  placing authority must use the criteria in Dimension III to determine a client s emotional, behavioral, and cognitive conditions and complications.   RISK DESCRIPTIONS - Severity ratin Client has difficulty with impulse control and lacks coping skills. Client has thoughts of suicide or harm to others without means; however, the thoughts may interfere with participation in some treatment activities. Client has difficulty functioning in significant life areas. Client has moderate symptoms of emotional, behavioral, or cognitive problems. Client is able to participate in most treatment activities.    REASONS SEVERITY WAS ASSIGNED - What current issues might with thinking, feelings or behavior pose barriers to participation in a treatment program? What coping skills or other assets does the person have to offset those issues? Are these problems that can be initially accommodated by a treatment provider? If not, what specialized skills or attributes must a provider have?    Pt reported mental health diagnosis. Pt past prescribed medications for MH symptoms but denied current. Pt reported she does not have a desire to get back on prescribed medications but thinks it may be beneficial. Pt reported past therapy but denied current. Pt denied past trauma/abuse issues or current issues. Pt denied current SI. Pt reported one past suicide attempt at age 15. She would appear to benefit from education on mental health and how it interacts with substance abuse.       DIMENSION IV - Readiness for Change   1. You ve told me what brought you here today. (first section) What do you think the problem really is?     Pt reported because she drinks too much.     2. Tell me how things are going. Ask enough questions to determine whether the person has use related problems or assets that can be built upon in the following areas: Family/friends/relationships; Legal; Financial; Emotional; Educational; Recreational/ leisure;  Vocational/employment; Living arrangements (DSM)      Relationships-poorly, not being as productive as I used to be.  Legal-none currently, went to [facility] at 20 once, domestic. Resolved.13 days total in [facility].  Financial-just making it  Hobbies-go fishing  Employment-Deliver pizza on weekends. Part time.   Living arrangements live with roommate    3. What activities have you engaged in when using alcohol/other drugs that could be hazardous to you or others (i.e. driving a car/motorcycle/boat, operating machinery, unsafe sex, sharing needles for drugs or tattoos, etc     The patient denied engaging in any of the above dangerous activities when using alcohol and/or drugs.    4. How much time do you spend getting, using or getting over using alcohol or drugs? (DSM)     Per patient: all of it.     5. Reasons for drinking/drug use (Use the space below to record answers. It may not be necessary to ask each item.)  Like the feeling No   Trying to forget problems No   To cope with stress No   To relieve physical pain No   To cope with anxiety Yes   To cope with depression Yes   To relax or unwind No   Makes it easier to talk with people No   Partner encourages use No   Most friends drink or use No   To cope with family problems No   Afraid of withdrawal symptoms/to feel better Yes   Other (specify)  No     A. What concerns other people about your alcohol or drug use/Has anyone told you that you use too much? What did they say? (DSM)     Per patient:about everyone. Smarter than that my mom  from it and my dad is currently dying from it.     B. What did you think about that/ do you think you have a problem with alcohol or drug use?     Per patient: drink too much.     6. What changes are you willing to make? What substance are you willing to stop using? How are you going to do that? Have you tried that before? What interfered with your success with that goal?      Per patient: want to stop drinking.     7. What would be  helpful to you in making this change?     Per patient: Maybe get back on medications but really do not want to at the same time. Treatment. I was thinking just outpatient and do stuff but due to covid-19 if it is all telephone communication I will keep drinking so maybe inpatient then.     Dimension IV Ratings   Readiness for Change - The placing authority must use the criteria in Dimension IV to determine a client s readiness for change.   RISK DESCRIPTIONS - Severity ratin Client displays verbal compliance, but lacks consistent behaviors; has low motivation for change; and is passively involved in treatment.    REASONS SEVERITY WAS ASSIGNED - (What information did the person provide that supports your assessment of his or her readiness to change? How aware is the person of problems caused by continued use? How willing is she or he to make changes? What does the person feel would be helpful? What has the person been able to do without help?)      Pt reported she drinks too much. Pt reported everyone has expressed concern about her use. Pt reported she is willing to seek treatment services. Pt reported she would like to quit drinking. Pt verbal report appears to be inconsistent with her behaviors. Per EHR ED admission note pt declined CD resources and reported she was not interested in seeking help/treatment on 3/21/20.        DIMENSION V - Relapse, Continued Use, and Continued Problem Potential   1A. In what ways have you tried to control, cut-down or quit your use? If you have had periods of sobriety, how did you accomplish that? What was helpful? What happened to prevent you from continuing your sobriety? (DSM)     per patient:  Only time not using is going through w/d or hospital setting to get through w/d. Prior to three months ago-was not sad then. Lost my job, lost routine and stuff like that. Lost previous job. Temp job but they let me go because of having a bad back. Triggers-depression    1B. What were  the circumstances of your most recent relapse with mood altering chemicals?    Per patient:depression    2. Have you experienced cravings? If yes, ask follow up questions to determine if the person recognizes triggers and if the person has had any success in dealing with them.     The patient reported having cravings to use mood altering chemicals on an almost daily basis.    3. Have you been treated for alcohol/other drug abuse/dependence? No-only as adolescent.     Per patient:5 months IP as and adolescent at 18 and then went to [facility] in [place].     4. Support group participation: Have you/do you attend support group meetings to reduce/stop your alcohol/drug use? How recently? What was your experience? Are you willing to restart? If the person has not participated, is he or she willing?     Per patient:Denied past or current.     5. What would assist you in staying sober/straight?     Per patient: Maybe get back on medications but really do not want to at the same time. Treatment. I was thinking just outpatient and do stuff but due to covid-19 if it is all telephone communication I will keep drinking so maybe inpatient then.       Dimension V Ratings   Relapse/Continued Use/Continued problem potential - The placing authority must use the criteria in Dimension V to determine a client s relapse, continued use, and continued problem potential.   RISK DESCRIPTIONS - Severity ratin No awareness of the negative impact of mental health problems or substance abuse. No coping skills to arrest mental health or addiction illnesses, or prevent relapse.    REASONS SEVERITY WAS ASSIGNED - (What information did the person provide that indicates his or her understanding of relapse issues? What about the person s experience indicates how prone he or she is to relapse? What coping skills does the person have that decrease relapse potential?)      Pt reported she is using daily half liter per day. Pt reported she has not had  any periods of sobriety for three months. Pt reported trigger. Pt reported cravings. Pt denied past treatment attendance as an adult. Pt reported two past treatment completion as a minor. Pt denied past sober support group meeting attendance. Pt lacks the appropriate recovery skills (relapse prevention skills, sober coping skills, impulse control, insight into the disease concept of addiction, and insight into co-occurring dx's) and is at a high risk for relapse/continued use.         DIMENSION VI - Recovery Environment   1. Are you employed/attending school? Tell me about that.     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 Describe a typical day; evening for you. Work, school, social, leisure, volunteer, spiritual practices. Include time spent obtaining, using, recovering from drugs or alcohol. (DSM)     Per patient:drinking all day    Please describe what leisure activities have been associated with your substance abuse:     Pt reported fishing    2B. How often do you spend more time than you planned using or use more than you planned? (DSM)     Pt reported:Every day    3. How important is using to your social connections? Do many of your family or friends use?     Per patient:Good amount of importance.     4A. Are you currently in a significant relationship?     Yes.  4B. How long? erica-like a month            Please describe your significant other's use of mood altering chemicals? Alcohol     4C. Sexual Orientation:     Heterosexual    5A. Who do you live with?      Per patient:roommate    5B. Tell me about their alcohol/drug use and mental health issues.     Per patient:Sometimes- occassionally    5C. Are you concerned for your safety there? No    5D. Are you concerned about the safety of anyone else who lives with you? No    6A. Do you have children who live with you?     No    6B. Do you have children who do not live with you?     No    7A. Who supports you in making changes in your alcohol or  drug use? What are they willing to do to support you? Who is upset or angry about you making changes in your alcohol or drug use? How big a problem is this for you?      Per patient:maybe aunt    7B. This table is provided to record information about the person s relationships and available support It is not necessary to ask each item; only to get a comprehensive picture of their support system.  How often can you count on the following people when you need someone?   Partner / Spouse Always supportive   Parent(s)/Aunt(s)/Uncle(s)/Grandparents Aunt-rarely supportive   Sibling(s)/Cousin(s) The patient doesn't have any current contact with siblings.   Child(jayden) The patient doesn't have any current contact with children.   Other relative(s) The patient doesn't have any current contact with other relatives.   Friend(s)/neighbor(s) Rarely supportive   Child(jayden) s father(s)/mother(s) The patient doesn't have any current contact with children(s) mother or father.   Support group member(s) The patient denied having any current involvement with 12-step or other support group meetings.   Community of serge members The patient denied having any current involvement with community serge members.   /counselor/therapist/healer The patient denied having any current involvement with a , counselor, therapist or healer.   Other (specify) No     8A. What is your current living situation?     Pt reported with roommate    8B. What is your long term plan for where you will be living?     Pt reported staying where she is.    8C. Tell me about your living environment/neighborhood? Ask enough follow up questions to determine safety, criminal activity, availability of alcohol and drugs, supportive or antagonistic to the person making changes.      No concerns reported    9. Criminal justice history: Gather current/recent history and any significant history related to substance use--Arrests? Convictions?  "Circumstances? Alcohol or drug involvement? Sentences? Still on probation or parole? Expectations of the court? Current court order? Any sex offenses - lifetime? What level? (DSM)    Per patient:none currently, went to long term at 20 once, domestic. Resolved.13 days total in long term.    10. What obstacles exist to participating in treatment? (Time off work, childcare, funding, transportation, pending long term time, living situation)     The patient denied having any obstacles for participating in substance abuse treatment.    Dimension VI Ratings   Recovery environment - The placing authority must use the criteria in Dimension VI to determine a client s recovery environment.   RISK DESCRIPTIONS - Severity ratin Client has (A) Chronically antagonistic significant other, living environment, family, peer group or long-term criminal justice involvement that is harmful to recovery or treatment progress, or (B) Client has an actively antagonistic significant other, family, work, or living environment with immediate threat to the client's safety and well-being.    REASONS SEVERITY WAS ASSIGNED - (What support does the person have for making changes? What structure/stability does the person have in his or her daily life that will increase the likelihood that changes can be sustained? 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 Pt reported using is important to her social connections. Pt reported her significant other drinks. Pt reported her father drinks. Pt reported her mother passed away due to alcohol related conditions. Pt reported her roommate drinks on occasion. Pt reported her support as \"maybe aunt\". Pt denied having children. Pt reported past legals but denied current. Pt appears to lack structure sober routine and sober support network. Pt appears to lack a living environment conducive to sobriety.   "       Client Choice/Exceptions   Would you like services specific to language, age, gender, culture, Buddhist preference, race, ethnicity, sexual orientation or disability?  No    What particular treatment choices and options would you like to have? inpatient    Do you have a preference for a particular treatment program? None    Criteria for Diagnosis     Criteria for Diagnosis  DSM-5 Criteria for Substance Use Disorder  Instructions: Determine whether the client currently meets the criteria for Substance Use Disorder using the diagnostic criteria in the DSM-V pp.481-589. Current means during the most recent 12 months outside a facility that controls access to substances    Category of Substance Severity (ICD-10 Code / DSM 5 Code)     Alcohol Use Disorder Severe  (10.20) (303.90)   Cannabis Use Disorder The patient does not meet the criteria for a Cannabis use disorder.   Hallucinogen Use Disorder The patient does not meet the criteria for a Hallucinogen use disorder.   Inhalant Use Disorder The patient does not meet the criteria for an Inhalant use disorder.   Opioid Use Disorder The patient does not meet the criteria for an Opioid use disorder.   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   Tobacco Use Disorder The patient does not meet the criteria for a Tobacco use disorder.   Other (or unknown) Substance Use Disorder The patient does not meet the criteria for a Other (or unknown) Substance use disorder.       Collateral Contact Summary   Number of contacts made: 1    Contact with referring person:  Yes    If court related records were reviewed, summarize here: No court records had been reviewed at the time of this documentation.    Information from collateral contacts supported/largely agreed with information from the client and associated risk ratings.      Rule 25 Assessment  Summary and Plan   's Recommendation    1. Abstain from using all non-prescribed mood altering chemicals and substances. Take all medication as prescribed and directed by licensed physicians.  2. Complete an /residential treatment program such as [facility], [place], or [facility].  3. For withdrawal symptoms and safe monitoring of withdrawal from alcohol go to nearest ED, or detox. Provided resources.    Collateral Contacts     Name:    Electronic Health Records   Relationship:    None   Phone Number:    None Releases:    No     Pt medical record was reviewed and utilized for collateral purposes of this assessment and largely agreed with the information from the patient.      Collateral Contacts     Name:    None   Relationship:    None   Phone Number:    None   Releases:    No     Declined to sign [facility] for emergency contact/collateral.     ollateral Contacts      A problematic pattern of alcohol/drug use leading to clinically significant impairment or distress, as manifested by at least two of the following, occurring within a 12-month period:    1.) Alcohol/drug is often taken in larger amounts or over a longer period than was intended.  2.) There is a persistent desire or unsuccessful efforts to cut down or control alcohol/drug use  3.) A great deal of time is spent in activities necessary to obtain alcohol, use alcohol, or recover from its effects.  4.) Craving, or a strong desire or urge to use alcohol/drug  6.) Continued alcohol use despite having persistent or recurrent social or interpersonal problems caused or exacerbated by the effects of alcohol/drug.  9.) Alcohol/drug use is continued despite knowledge of having a persistent or recurrent physical or psychological problem that is likely to have been caused or exacerbated by alcohol.  10.) Tolerance, as defined by either of the following: A need for markedly increased amounts of alcohol/drug to achieve intoxication or  desired effect. and A markedly diminished effect with continued use of the same amount of alcohol/drug.  11.) Withdrawal, as manifested by either of the following: The characteristic withdrawal syndrome for alcohol/drug (refer to Criteria A and B of the criteria set for alcohol/drug withdrawal). and Alcohol/drug (or a closely related substance, such as a benzodiazepine) is taken to relieve or avoid withdrawal symptoms.      Specify if: In early remission:  After full criteria for alcohol/drug use disorder were previously met, none of the criteria for alcohol/drug use disorder have been met for at least 3 months but for less than 12 months (with the exception that Criterion A4,  Craving or a strong desire or urge to use alcohol/drug  may be met).     In sustained remission:   After full criteria for alcohol use disorder were previously met, non of the criteria for alcohol/drug use disorder have been met at any time during a period of 12 months or longer (with the exception that Criterion A4,  Craving or strong desire or urge to use alcohol/drug  may be met).   Specify if:   This additional specifier is used if the individual is in an environment where access to alcohol is restricted.    Mild: Presence of 2-3 symptoms  Moderate: Presence of 4-5 symptoms  Severe: Presence of 6 or more symptoms

## 2020-03-27 ASSESSMENT — ANXIETY QUESTIONNAIRES: GAD7 TOTAL SCORE: 5

## 2020-04-02 ENCOUNTER — APPOINTMENT (OUTPATIENT)
Dept: GENERAL RADIOLOGY | Facility: CLINIC | Age: 25
End: 2020-04-02
Attending: PHYSICIAN ASSISTANT
Payer: COMMERCIAL

## 2020-04-02 ENCOUNTER — HOSPITAL ENCOUNTER (EMERGENCY)
Facility: CLINIC | Age: 25
Discharge: HOME | End: 2020-04-02
Attending: PHYSICIAN ASSISTANT | Admitting: PHYSICIAN ASSISTANT
Payer: COMMERCIAL

## 2020-04-02 VITALS
OXYGEN SATURATION: 97 % | BODY MASS INDEX: 26.58 KG/M2 | SYSTOLIC BLOOD PRESSURE: 145 MMHG | HEART RATE: 104 BPM | RESPIRATION RATE: 14 BRPM | TEMPERATURE: 98.1 F | WEIGHT: 180 LBS | DIASTOLIC BLOOD PRESSURE: 92 MMHG

## 2020-04-02 DIAGNOSIS — S05.92XA LEFT EYE INJURY, INITIAL ENCOUNTER: ICD-10-CM

## 2020-04-02 PROCEDURE — 99283 EMERGENCY DEPT VISIT LOW MDM: CPT | Performed by: PHYSICIAN ASSISTANT

## 2020-04-02 PROCEDURE — 70200 X-RAY EXAM OF EYE SOCKETS: CPT

## 2020-04-02 PROCEDURE — 99284 EMERGENCY DEPT VISIT MOD MDM: CPT | Mod: Z6 | Performed by: PHYSICIAN ASSISTANT

## 2020-04-02 RX ORDER — COPPER 313.4 MG/1
1 INTRAUTERINE DEVICE INTRAUTERINE ONCE
COMMUNITY
End: 2020-08-15

## 2020-04-02 RX ORDER — MELOXICAM 15 MG/1
15 TABLET ORAL DAILY
COMMUNITY
Start: 2020-01-29 | End: 2020-08-15

## 2020-04-02 RX ORDER — FLUTICASONE PROPIONATE 50 MCG
1 SPRAY, SUSPENSION (ML) NASAL DAILY
Refills: 0 | COMMUNITY
Start: 2019-07-16 | End: 2020-08-15

## 2020-04-02 ASSESSMENT — ENCOUNTER SYMPTOMS
EYE DISCHARGE: 0
GASTROINTESTINAL NEGATIVE: 1
CONSTITUTIONAL NEGATIVE: 1
EYE ITCHING: 0
PHOTOPHOBIA: 0
HEADACHES: 1
EYE REDNESS: 0
EYE PAIN: 0

## 2020-04-02 ASSESSMENT — VISUAL ACUITY: OU: 1

## 2020-04-02 NOTE — ED AVS SNAPSHOT
[facility]  [address] [address]  Phone:  [phone]  Fax:  [phone]                                    Ana Murphy   MRN: [id number]    Department:  [facility]   Date of Visit:  4/2/2020           After Visit Summary Signature Page    I have received my discharge instructions, and my questions have been answered. I have discussed any challenges I see with this plan with the nurse or doctor.    ..........................................................................................................................................  Patient/Patient Representative Signature      ..........................................................................................................................................  Patient Representative Print Name and Relationship to Patient    ..................................................               ................................................  Date                                   Time    ..........................................................................................................................................  Reviewed by Signature/Title    ...................................................              ..............................................  Date                                               Time          22EPIC Rev 08/18

## 2020-04-02 NOTE — DISCHARGE INSTRUCTIONS
Call [facility] at 8:30 AM tomorrow morning at [phone] for follow-up eye exam tomorrow
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
## 2020-04-20 RX ORDER — ONDANSETRON 4 MG/1
TABLET, ORALLY DISINTEGRATING ORAL
Qty: 10 TABLET | Refills: 0 | OUTPATIENT
Start: 2020-04-20

## 2020-04-28 RX ORDER — PHENAZOPYRIDINE HYDROCHLORIDE 200 MG/1
TABLET, FILM COATED ORAL
Qty: 9 TABLET | Refills: 0 | OUTPATIENT
Start: 2020-04-28

## 2020-05-26 RX ORDER — PHENAZOPYRIDINE HYDROCHLORIDE 200 MG/1
TABLET, FILM COATED ORAL
Qty: 9 TABLET | Refills: 0 | OUTPATIENT
Start: 2020-05-26

## 2020-08-08 ENCOUNTER — HOSPITAL ENCOUNTER (EMERGENCY)
Facility: CLINIC | Age: 25
Discharge: HOME | End: 2020-08-08
Payer: COMMERCIAL

## 2020-08-08 VITALS
DIASTOLIC BLOOD PRESSURE: 92 MMHG | WEIGHT: 190 LBS | TEMPERATURE: 98.6 F | RESPIRATION RATE: 16 BRPM | OXYGEN SATURATION: 99 % | BODY MASS INDEX: 28.06 KG/M2 | SYSTOLIC BLOOD PRESSURE: 132 MMHG
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
## 2020-08-15 ENCOUNTER — HOSPITAL ENCOUNTER (EMERGENCY)
Facility: CLINIC | Age: 25
Discharge: HOME | End: 2020-08-15
Attending: PHYSICIAN ASSISTANT | Admitting: PHYSICIAN ASSISTANT
Payer: COMMERCIAL

## 2020-08-15 ENCOUNTER — APPOINTMENT (OUTPATIENT)
Dept: GENERAL RADIOLOGY | Facility: CLINIC | Age: 25
End: 2020-08-15
Attending: PHYSICIAN ASSISTANT
Payer: COMMERCIAL

## 2020-08-15 VITALS
RESPIRATION RATE: 20 BRPM | HEART RATE: 91 BPM | BODY MASS INDEX: 28.14 KG/M2 | DIASTOLIC BLOOD PRESSURE: 89 MMHG | SYSTOLIC BLOOD PRESSURE: 128 MMHG | WEIGHT: 190 LBS | TEMPERATURE: 98 F | HEIGHT: 69 IN | OXYGEN SATURATION: 98 %

## 2020-08-15 DIAGNOSIS — N30.00 ACUTE CYSTITIS WITHOUT HEMATURIA: ICD-10-CM

## 2020-08-15 DIAGNOSIS — S40.022A ARM CONTUSION, LEFT, INITIAL ENCOUNTER: ICD-10-CM

## 2020-08-15 LAB
ALBUMIN UR-MCNC: NEGATIVE MG/DL
APPEARANCE UR: ABNORMAL
BACTERIA #/AREA URNS HPF: ABNORMAL /HPF
BILIRUB UR QL STRIP: NEGATIVE
COLOR UR AUTO: YELLOW
GLUCOSE UR STRIP-MCNC: NEGATIVE MG/DL
HCG UR QL: NEGATIVE
HGB UR QL STRIP: ABNORMAL
KETONES UR STRIP-MCNC: NEGATIVE MG/DL
LEUKOCYTE ESTERASE UR QL STRIP: ABNORMAL
MUCOUS THREADS #/AREA URNS LPF: PRESENT /LPF
NITRATE UR QL: NEGATIVE
PH UR STRIP: 6 PH (ref 5–7)
RBC #/AREA URNS AUTO: 1 /HPF (ref 0–2)
SOURCE: ABNORMAL
SP GR UR STRIP: 1 (ref 1–1.03)
SQUAMOUS #/AREA URNS AUTO: 4 /HPF (ref 0–1)
UROBILINOGEN UR STRIP-MCNC: 0 MG/DL (ref 0–2)
WBC #/AREA URNS AUTO: 70 /HPF (ref 0–5)

## 2020-08-15 PROCEDURE — 87086 URINE CULTURE/COLONY COUNT: CPT | Performed by: PHYSICIAN ASSISTANT

## 2020-08-15 PROCEDURE — 99284 EMERGENCY DEPT VISIT MOD MDM: CPT | Mod: Z6 | Performed by: PHYSICIAN ASSISTANT

## 2020-08-15 PROCEDURE — 99284 EMERGENCY DEPT VISIT MOD MDM: CPT | Performed by: PHYSICIAN ASSISTANT

## 2020-08-15 PROCEDURE — 81001 URINALYSIS AUTO W/SCOPE: CPT | Performed by: PHYSICIAN ASSISTANT

## 2020-08-15 PROCEDURE — 87088 URINE BACTERIA CULTURE: CPT | Performed by: PHYSICIAN ASSISTANT

## 2020-08-15 PROCEDURE — 81025 URINE PREGNANCY TEST: CPT | Performed by: PHYSICIAN ASSISTANT

## 2020-08-15 PROCEDURE — 87186 SC STD MICRODIL/AGAR DIL: CPT | Performed by: PHYSICIAN ASSISTANT

## 2020-08-15 PROCEDURE — 73060 X-RAY EXAM OF HUMERUS: CPT | Mod: LT

## 2020-08-15 RX ORDER — CEPHALEXIN 500 MG/1
500 CAPSULE ORAL 3 TIMES DAILY
Qty: 30 CAPSULE | Refills: 0 | Status: SHIPPED | OUTPATIENT
Start: 2020-08-15 | End: 2020-08-25

## 2020-08-15 RX ORDER — PHENAZOPYRIDINE HYDROCHLORIDE 200 MG/1
200 TABLET, FILM COATED ORAL 3 TIMES DAILY PRN
Qty: 6 TABLET | Refills: 0 | Status: SHIPPED | OUTPATIENT
Start: 2020-08-15 | End: 2020-08-17

## 2020-08-15 ASSESSMENT — MIFFLIN-ST. JEOR: SCORE: 1686.21
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
## 2020-08-15 NOTE — ED AVS SNAPSHOT
[facility] Emergency Department  [address] [address]  Phone:  [phone]  Fax:  [phone]                                    Ana Murphy   MRN: [id number]    Department:  [facility] Emergency Department   Date of Visit:  8/15/2020           After Visit Summary Signature Page    I have received my discharge instructions, and my questions have been answered. I have discussed any challenges I see with this plan with the nurse or doctor.    ..........................................................................................................................................  Patient/Patient Representative Signature      ..........................................................................................................................................  Patient Representative Print Name and Relationship to Patient    ..................................................               ................................................  Date                                   Time    ..........................................................................................................................................  Reviewed by Signature/Title    ...................................................              ..............................................  Date                                               Time          22EPIC Rev 08/18

## 2020-08-16 NOTE — RESULT ENCOUNTER NOTE
Emergency Dept/Urgent Care discharge antibiotic (if prescribed): Cephalexin (Keflex) 500 mg capsule, 1 capsule (500 mg) by mouth 3 times daily for 10 days.  Date of Rx (if applicable):  8/15/20  No changes in treatment per Urine culture protocol.

## 2020-08-17 LAB
BACTERIA SPEC CULT: ABNORMAL
Lab: ABNORMAL
SPECIMEN SOURCE: ABNORMAL

## 2020-08-17 NOTE — RESULT ENCOUNTER NOTE
Final Urine Culture Report on 8/17/20  Emergency Dept/Urgent Care discharge antibiotic prescribed: Cephalexin (Keflex) 500 mg capsule, 1 capsule (500 mg) by mouth 3 times daily for 10 days.   #1. Bacteria, 50,000 to 100,000 colonies/ml Escherichia coli, is SUSCEPTIBLE to Antibiotic.    As per [facility] ED Lab Result protocol, no change in antibiotic therapy.

## 2021-04-18 ENCOUNTER — HEALTH MAINTENANCE LETTER (OUTPATIENT)
Age: 26
End: 2021-04-18

## 2021-10-02 ENCOUNTER — HEALTH MAINTENANCE LETTER (OUTPATIENT)
Age: 26
End: 2021-10-02

## 2022-04-27 ENCOUNTER — MEDICAL CORRESPONDENCE (OUTPATIENT)
Dept: HEALTH INFORMATION MANAGEMENT | Facility: CLINIC | Age: 27
End: 2022-04-27
Payer: COMMERCIAL

## 2022-05-12 ENCOUNTER — MEDICAL CORRESPONDENCE (OUTPATIENT)
Dept: HEALTH INFORMATION MANAGEMENT | Facility: CLINIC | Age: 27
End: 2022-05-12

## 2022-05-12 ENCOUNTER — OFFICE VISIT (OUTPATIENT)
Dept: EDUCATION SERVICES | Facility: CLINIC | Age: 27
End: 2022-05-12
Payer: COMMERCIAL

## 2022-05-12 VITALS — WEIGHT: 248 LBS | HEIGHT: 70 IN | BODY MASS INDEX: 35.5 KG/M2

## 2022-05-12 DIAGNOSIS — E66.812 CLASS 2 OBESITY: Primary | ICD-10-CM

## 2022-05-12 PROCEDURE — 97802 MEDICAL NUTRITION INDIV IN: CPT | Performed by: DIETITIAN, REGISTERED

## 2022-05-12 NOTE — PROGRESS NOTES
"Medical Nutrition Therapy  Visit Type:Initial assessment and intervention    Ana Murphy presents today for MNT and education related to weight management.   She is accompanied by self.     ASSESSMENT:   Patient comments/concerns relating to nutrition: Patient states she has been going to the gym 5 or more days per week participating in both cardio and strength training without significant weight loss results.  Patient would like dietary recommendations along with medication intervention.      NUTRITION HISTORY:  Patient typically skips breakfast consumes a balanced lunch and dinner patient notes at times she does overeat but overall if she is eating 2 fruit per day and 1 or 2 vegetables trying to consume lean protein and does not eat out often    Food allergies/intolerances: none      EXERCISE: Currently exercising 2-3 times per week both cardio and strength training    SOCIO/ECONOMIC:   Lives with: self  Food stamps recently discontinued for patient patient does go to the food shelf as able    MEDICATIONS:  Current Outpatient Medications   Medication     Biotin w/ Vitamins C & E (HAIR/SKIN/NAILS) 1250-7.5-7.5 MCG-MG-UNT CHEW     spironolactone (ALDACTONE) 50 MG tablet     No current facility-administered medications for this visit.       LABS:  Last Basic Metabolic Panel:  Lab Results   Component Value Date     03/21/2020      Lab Results   Component Value Date    POTASSIUM 4.1 03/21/2020     Lab Results   Component Value Date    CHLORIDE 99 03/21/2020     Lab Results   Component Value Date    KATERINA 9.2 03/21/2020     Lab Results   Component Value Date    CO2 22 03/21/2020     Lab Results   Component Value Date    BUN 7 03/21/2020     Lab Results   Component Value Date    CR 0.71 03/21/2020     Lab Results   Component Value Date    GLC 69 03/21/2020       ANTHROPOMETRICS:  Vitals: Ht 1.765 m (5' 9.5\")   Wt 112.5 kg (248 lb)   BMI 36.10 kg/m    Body mass index is 36.1 kg/m .      Wt Readings from " Last 5 Encounters:   05/12/22 112.5 kg (248 lb)   08/15/20 86.2 kg (190 lb)   04/02/20 81.6 kg (180 lb)   03/26/20 86.2 kg (190 lb)   03/21/20 81.6 kg (180 lb)       ESTIMATED KCAL REQUIREMENTS:  1500 kcal per day    NUTRITION DIAGNOSIS: Overweight/Obesity related to increased calorie intake as evidenced by BMI 36    NUTRITION INTERVENTION:    Nutrition Prescription: Energy Intake: 1500 kcal/day  Education given to support: general nutrition guidelines, weight reduction, exercise and portion control  Education Materials Provided: My Plate Planner/Choose My Plate and Fiber Facts     Discussed what dietary changes has worked well for her in the past and how to incorporate them again.  Discussion on healthy behavior changes that can promote calorie reduction in diet.    We discussed why it is important to incorporate healthy lifestyle interventions to control overall health to prevent complications of being obese including the potential of developing diabetes and preventing heart disease.  Pt with family hx of diabetes.    Nutritional Psychiatry Your Brain on Food - discussion on how what you eat affects microbiome/ hormones and how you feel physically and emotionally.    Hunger/ Fullness cues - help identify how pt is feeling before, during, and after eating   Smart Snacking and 20 Ways to eat more fruit and vegetables.    Mindful eating - listening to body vs eating out of stress, boredom, emotion, eating to fuel body for strength and energy   Tracking food - balance of meals and snacks   Importance of daily physical activity as able to promote endorphin release       reduce stress, anxiety, and depression, improve sleep, increase energy level, improve muscle tone and strength ...   Recommend: Discussed options for medications and reduced calorie dietary guidelines.      WEGOVY first choice weekly injection, 4 pens/ month     Dose Escalation Schedule  Weeks    Weekly Dose  1 through 4    0.25 mg  5 through 8    0.5  mg  9 through 12    1 mg  13 through 16   1.7 mg  Week 17 and onward  2.4 mg Maintenance dose    OR    Ozempic: proper use, mechanism of action, indications, dosing, injection schedule, safety and side effects.  Pt is shown a demonstration of the use of the pen and was able to return demonstration without difficulty.   First prescription for 2mg/ 1.5 mL              0.25 mg weekly x 2 weeks                    0.50 mg weekly x 3 weeks   Second prescription for 4mg/ 3mL               goal dose 1mg weekly    Goals:   1.  Practice healthy stress management and get good quality sleep with the goal of 7-8 hours per night.    2.  Increase physical activity and make this a part of a daily routine.  Follow therapy recommendations   3.  Eat in a healthy way, per food plate method/ weight watchers.  Keep a food record.  Eat 3 meals/ day.  A meal is three or more food groups; make it colorful for better nutrition.  Focus on portion control.  Follow weight loss tips and mindful eating.    4.  Goal weight 223  by 12/2022  5.  Start Wegovy or Ozempic     PATIENT'S BEHAVIOR CHANGE GOALS:   See Patient Instructions for patient stated behavior change goals. AVS was printed and given to patient at today's appointment.    MONITOR / EVALUATE:  RD will monitor/evaluate:  Food / Beverage / Nutrient intake   Pertinent Labs  Weight change    FOLLOW-UP:  Follow up with RD as needed.    Time spent in minutes: 30  Encounter: Individual

## 2022-05-12 NOTE — PATIENT INSTRUCTIONS
WEGOVY first choice weekly injection, 4 pens/ month     Dose Escalation Schedule  Weeks    Weekly Dose  1 through 4    0.25 mg  5 through 8    0.5 mg  9 through 12    1 mg  13 through 16   1.7 mg  Week 17 and onward  2.4 mg Maintenance dose    If patients do not tolerate a dose during dose escalation,  consider delaying dose escalation for 4 weeks.  The maintenance dose of WEGOVY  is 2.4 mg injected  subcutaneously once-weekly.  If patients do not tolerate the maintenance 2.4 mg once-weekly  dose, the dose can be temporarily decreased to 1.7 mg  once-weekly, for a maximum of 4 weeks. After 4 weeks,  increase WEGOVY  to the maintenance 2.4 mg once-weekly.  Discontinue WEGOVY  if the patient cannot tolerate the 2.4  mg dose.     OR    Ozempic Pen Instructions (you will find them printed in color too)   Take off pen cover  Take off paper cover on needle  Push needle on pen and twist until snug  Take off two needle covers  Air Shot - First time only (once per new pen) to make sure the plunger has all the air out.  Turn the dial to --? and shoot to air and make sure medicine drops appears at the end of the needle.  If not - do this step again   Turn dial to 0.25 x 2 weeks.  0.50 x 3 weeks   Inject into self (hold for 15 seconds)  Will likely increase to 1.0 mg dose as a goal dose  Twist and pull off needle and dispose in biohazard container  Put cover back on pen         ~1500 calories/ day         Goals:  Practice healthy stress management (mindful eating, think are you physically hungry or are you board, stressed, emotional etc.) make of list of things to do besides eat.    Try to get good quality sleep with a goal of 7-8 hours per night.  Stay physically active on a daily basis and throughout the year.  Recommend a fitness tracker; gradually increase the average to a minimum of 6000 steps with the ultimate goal of 10,000 steps per day.      Eat in a healthy way; follow the plate method.  Keep a food record  ([employer]; [employer]).  Nutrition reference:  Eat 3 meals a day; snacks are optional.    A meal is 3 or more food groups; make it colorful for better nutrition.

## 2022-05-12 NOTE — LETTER
5/12/2022         RE: Ana Murphy  [address] [facility] [id number]        Dear Colleague,    Thank you for referring your patient, Ana Murphy, to the [facility]. Please see a copy of my visit note below.    Medical Nutrition Therapy  Visit Type:Initial assessment and intervention    Ana Murphy presents today for MNT and education related to weight management.   She is accompanied by self.     ASSESSMENT:   Patient comments/concerns relating to nutrition: Patient states she has been going to the gym 5 or more days per week participating in both cardio and strength training without significant weight loss results.  Patient would like dietary recommendations along with medication intervention.      NUTRITION HISTORY:  Patient typically skips breakfast consumes a balanced lunch and dinner patient notes at times she does overeat but overall if she is eating 2 fruit per day and 1 or 2 vegetables trying to consume lean protein and does not eat out often    Food allergies/intolerances: none      EXERCISE: Currently exercising 2-3 times per week both cardio and strength training    SOCIO/ECONOMIC:   Lives with: self  Food stamps recently discontinued for patient patient does go to the food shelf as able    MEDICATIONS:  Current Outpatient Medications   Medication     Biotin w/ Vitamins C & E (HAIR/SKIN/NAILS) 1250-7.5-7.5 MCG-MG-UNT CHEW     spironolactone (ALDACTONE) 50 MG tablet     No current facility-administered medications for this visit.       LABS:  Last Basic Metabolic Panel:  Lab Results   Component Value Date     03/21/2020      Lab Results   Component Value Date    POTASSIUM 4.1 03/21/2020     Lab Results   Component Value Date    CHLORIDE 99 03/21/2020     Lab Results   Component Value Date    KATERINA 9.2 03/21/2020     Lab Results   Component Value Date    CO2 22 03/21/2020     Lab Results   Component Value Date    BUN 7 03/21/2020     Lab  "Results   Component Value Date    CR 0.71 03/21/2020     Lab Results   Component Value Date    GLC 69 03/21/2020       ANTHROPOMETRICS:  Vitals: Ht 1.765 m (5' 9.5\")   Wt 112.5 kg (248 lb)   BMI 36.10 kg/m    Body mass index is 36.1 kg/m .      Wt Readings from Last 5 Encounters:   05/12/22 112.5 kg (248 lb)   08/15/20 86.2 kg (190 lb)   04/02/20 81.6 kg (180 lb)   03/26/20 86.2 kg (190 lb)   03/21/20 81.6 kg (180 lb)       ESTIMATED KCAL REQUIREMENTS:  1500 kcal per day    NUTRITION DIAGNOSIS: Overweight/Obesity related to increased calorie intake as evidenced by BMI 36    NUTRITION INTERVENTION:    Nutrition Prescription: Energy Intake: 1500 kcal/day  Education given to support: general nutrition guidelines, weight reduction, exercise and portion control  Education Materials Provided: My Plate Planner/Choose My Plate and Fiber Facts     Discussed what dietary changes has worked well for her in the past and how to incorporate them again.  Discussion on healthy behavior changes that can promote calorie reduction in diet.    We discussed why it is important to incorporate healthy lifestyle interventions to control overall health to prevent complications of being obese including the potential of developing diabetes and preventing heart disease.  Pt with family hx of diabetes.    Nutritional Psychiatry Your Brain on Food - discussion on how what you eat affects microbiome/ hormones and how you feel physically and emotionally.    Hunger/ Fullness cues - help identify how pt is feeling before, during, and after eating   Smart Snacking and 20 Ways to eat more fruit and vegetables.    Mindful eating - listening to body vs eating out of stress, boredom, emotion, eating to fuel body for strength and energy   Tracking food - balance of meals and snacks   Importance of daily physical activity as able to promote endorphin release       reduce stress, anxiety, and depression, improve sleep, increase energy level, improve " muscle tone and strength ...   Recommend: Discussed options for medications and reduced calorie dietary guidelines.      WEGOVY first choice weekly injection, 4 pens/ month     Dose Escalation Schedule  Weeks    Weekly Dose  1 through 4    0.25 mg  5 through 8    0.5 mg  9 through 12    1 mg  13 through 16   1.7 mg  Week 17 and onward  2.4 mg Maintenance dose    OR    Ozempic: proper use, mechanism of action, indications, dosing, injection schedule, safety and side effects.  Pt is shown a demonstration of the use of the pen and was able to return demonstration without difficulty.   First prescription for 2mg/ 1.5 mL              0.25 mg weekly x 2 weeks                    0.50 mg weekly x 3 weeks   Second prescription for 4mg/ 3mL               goal dose 1mg weekly    Goals:   1.  Practice healthy stress management and get good quality sleep with the goal of 7-8 hours per night.    2.  Increase physical activity and make this a part of a daily routine.  Follow therapy recommendations   3.  Eat in a healthy way, per food plate method/ weight watchers.  Keep a food record.  Eat 3 meals/ day.  A meal is three or more food groups; make it colorful for better nutrition.  Focus on portion control.  Follow weight loss tips and mindful eating.    4.  Goal weight 223  by 12/2022  5.  Start Wegovy or Ozempic     PATIENT'S BEHAVIOR CHANGE GOALS:   See Patient Instructions for patient stated behavior change goals. AVS was printed and given to patient at today's appointment.    MONITOR / EVALUATE:  RD will monitor/evaluate:  Food / Beverage / Nutrient intake   Pertinent Labs  Weight change    FOLLOW-UP:  Follow up with RD as needed.    Time spent in minutes: 30  Encounter: Individual

## 2022-05-14 ENCOUNTER — HEALTH MAINTENANCE LETTER (OUTPATIENT)
Age: 27
End: 2022-05-14

## 2022-05-18 ENCOUNTER — TELEPHONE (OUTPATIENT)
Dept: EDUCATION SERVICES | Facility: CLINIC | Age: 27
End: 2022-05-18
Payer: COMMERCIAL

## 2022-05-18 NOTE — TELEPHONE ENCOUNTER
Reason for Call:  Other Medication issue    Detailed comments: Wegovy    Phone Number Patient can be reached at: Home number on file [phone] (home)    Best Time: anytime    Can we leave a detailed message on this number? YES    Call taken on 5/18/2022 at 12:02 PM by ALVINA BILL

## 2022-05-18 NOTE — TELEPHONE ENCOUNTER
Returned patient's call patient was told by her pharmacy that Wegovy is no longer being being manufactured.  I called Wegovy to discuss and talk to their  the information the patient received is incorrect.  Wegovy is low dose is on significant backorder times 3+ months therefore recommendation for patient to contact primary care provider and have Ozempic prescribed.  Ozempic was discussed during consult and patient feels comfortable with plan

## 2022-09-03 ENCOUNTER — HEALTH MAINTENANCE LETTER (OUTPATIENT)
Age: 27
End: 2022-09-03

## 2023-01-15 ENCOUNTER — HEALTH MAINTENANCE LETTER (OUTPATIENT)
Age: 28
End: 2023-01-15

## 2024-07-06 ENCOUNTER — HEALTH MAINTENANCE LETTER (OUTPATIENT)
Age: 29
End: 2024-07-06